# Patient Record
Sex: FEMALE | Race: ASIAN | NOT HISPANIC OR LATINO | Employment: STUDENT | URBAN - METROPOLITAN AREA
[De-identification: names, ages, dates, MRNs, and addresses within clinical notes are randomized per-mention and may not be internally consistent; named-entity substitution may affect disease eponyms.]

---
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
## 2022-02-28 ENCOUNTER — MYC REFILL (OUTPATIENT)
Dept: PEDIATRICS | Facility: CLINIC | Age: 22
End: 2022-02-28

## 2022-02-28 DIAGNOSIS — N94.6 DYSMENORRHEA: ICD-10-CM

## 2022-03-02 RX ORDER — ACETAMINOPHEN AND CODEINE PHOSPHATE 120; 12 MG/5ML; MG/5ML
0.35 SOLUTION ORAL DAILY
Qty: 84 TABLET | Refills: 1 | Status: SHIPPED | OUTPATIENT
Start: 2022-03-02 | End: 2022-08-08

## 2022-03-02 NOTE — TELEPHONE ENCOUNTER
"Last Written Prescription Date:  1/12/21  Last Fill Quantity: 84,  # refills: 3   Last office visit provider:  7/6/21     Requested Prescriptions   Pending Prescriptions Disp Refills     norethindrone (MICRONOR) 0.35 MG tablet 84 tablet 3     Sig: Take 1 tablet (0.35 mg) by mouth daily       Contraceptives Protocol Passed - 3/2/2022  3:04 PM        Passed - Patient is not a current smoker if age is 35 or older        Passed - Recent (12 mo) or future (30 days) visit within the authorizing provider's specialty     Patient has had an office visit with the authorizing provider or a provider within the authorizing providers department within the previous 12 mos or has a future within next 30 days. See \"Patient Info\" tab in inbasket, or \"Choose Columns\" in Meds & Orders section of the refill encounter.              Passed - Medication is active on med list        Passed - No active pregnancy on record        Passed - No positive pregnancy test in past 12 months             Lorenzo Hermosillo RN 03/02/22 3:04 PM  "

## 2022-04-03 ENCOUNTER — HEALTH MAINTENANCE LETTER (OUTPATIENT)
Age: 22
End: 2022-04-03

## 2022-08-08 DIAGNOSIS — N94.6 DYSMENORRHEA: ICD-10-CM

## 2022-08-08 NOTE — TELEPHONE ENCOUNTER
Patients pharmacy faxed over a request for a refill on her birth control. Patient has not been seen since 7/13/2021.

## 2022-08-08 NOTE — TELEPHONE ENCOUNTER
"Routing refill request to provider for review/approval because:  Patient needs to be seen because it has been more than 1 year since last office visit.    Last Written Prescription Date:  3/2/22  Last Fill Quantity: 84,  # refills: 1   Last office visit provider:  7/6/21     Requested Prescriptions   Pending Prescriptions Disp Refills     norethindrone (MICRONOR) 0.35 MG tablet 84 tablet 0     Sig: Take 1 tablet (0.35 mg) by mouth daily       Contraceptives Protocol Failed - 8/8/2022 10:29 AM        Failed - Recent (12 mo) or future (30 days) visit within the authorizing provider's specialty     Patient has had an office visit with the authorizing provider or a provider within the authorizing providers department within the previous 12 mos or has a future within next 30 days. See \"Patient Info\" tab in inbasket, or \"Choose Columns\" in Meds & Orders section of the refill encounter.              Passed - Patient is not a current smoker if age is 35 or older        Passed - Medication is active on med list        Passed - No active pregnancy on record        Passed - No positive pregnancy test in past 12 months             Kayla Ridley RN 08/08/22 4:44 PM  "

## 2022-08-10 RX ORDER — ACETAMINOPHEN AND CODEINE PHOSPHATE 120; 12 MG/5ML; MG/5ML
0.35 SOLUTION ORAL DAILY
Qty: 28 TABLET | Refills: 0 | Status: SHIPPED | OUTPATIENT
Start: 2022-08-10 | End: 2022-09-02

## 2022-08-10 NOTE — TELEPHONE ENCOUNTER
Spoke with patient regarding refill and this has been filled for one month. Patient is scheduled with  on September 2nd.

## 2022-08-10 NOTE — TELEPHONE ENCOUNTER
Patient is overdue for follow up and nothing is scheduled.    I am willing to prescribe a 1 month supply.    Please notify patient and assist in scheduling follow up and well care with Dr. Salmon.

## 2022-09-01 ASSESSMENT — ENCOUNTER SYMPTOMS
DYSURIA: 0
HEADACHES: 0
FREQUENCY: 0
BREAST MASS: 0
HEMATOCHEZIA: 0
EYE PAIN: 0
ARTHRALGIAS: 0
SHORTNESS OF BREATH: 0
CONSTIPATION: 0
FEVER: 0
MYALGIAS: 0
CHILLS: 0
JOINT SWELLING: 0
PALPITATIONS: 0
WEAKNESS: 0
DIZZINESS: 0
PARESTHESIAS: 0
ABDOMINAL PAIN: 0
NAUSEA: 0
DIARRHEA: 0
NERVOUS/ANXIOUS: 0
HEARTBURN: 0
SORE THROAT: 0
HEMATURIA: 0
COUGH: 0

## 2022-09-01 ASSESSMENT — ASTHMA QUESTIONNAIRES
QUESTION_5 LAST FOUR WEEKS HOW WOULD YOU RATE YOUR ASTHMA CONTROL: COMPLETELY CONTROLLED
ACT_TOTALSCORE: 24
QUESTION_2 LAST FOUR WEEKS HOW OFTEN HAVE YOU HAD SHORTNESS OF BREATH: NOT AT ALL
QUESTION_4 LAST FOUR WEEKS HOW OFTEN HAVE YOU USED YOUR RESCUE INHALER OR NEBULIZER MEDICATION (SUCH AS ALBUTEROL): ONCE A WEEK OR LESS
QUESTION_1 LAST FOUR WEEKS HOW MUCH OF THE TIME DID YOUR ASTHMA KEEP YOU FROM GETTING AS MUCH DONE AT WORK, SCHOOL OR AT HOME: NONE OF THE TIME
QUESTION_3 LAST FOUR WEEKS HOW OFTEN DID YOUR ASTHMA SYMPTOMS (WHEEZING, COUGHING, SHORTNESS OF BREATH, CHEST TIGHTNESS OR PAIN) WAKE YOU UP AT NIGHT OR EARLIER THAN USUAL IN THE MORNING: NOT AT ALL
ACT_TOTALSCORE: 24

## 2022-09-02 ENCOUNTER — OFFICE VISIT (OUTPATIENT)
Dept: PEDIATRICS | Facility: CLINIC | Age: 22
End: 2022-09-02
Payer: COMMERCIAL

## 2022-09-02 VITALS
SYSTOLIC BLOOD PRESSURE: 102 MMHG | HEIGHT: 61 IN | DIASTOLIC BLOOD PRESSURE: 68 MMHG | WEIGHT: 144.5 LBS | BODY MASS INDEX: 27.28 KG/M2 | TEMPERATURE: 98.6 F | RESPIRATION RATE: 18 BRPM

## 2022-09-02 DIAGNOSIS — Z00.00 ROUTINE HISTORY AND PHYSICAL EXAMINATION OF ADULT: Primary | ICD-10-CM

## 2022-09-02 DIAGNOSIS — N94.6 DYSMENORRHEA: ICD-10-CM

## 2022-09-02 DIAGNOSIS — G43.109 MIGRAINE WITH AURA AND WITHOUT STATUS MIGRAINOSUS, NOT INTRACTABLE: ICD-10-CM

## 2022-09-02 DIAGNOSIS — R05.9 COUGH: ICD-10-CM

## 2022-09-02 DIAGNOSIS — J45.30 MILD PERSISTENT ASTHMA WITHOUT COMPLICATION: ICD-10-CM

## 2022-09-02 PROCEDURE — 90471 IMMUNIZATION ADMIN: CPT | Performed by: PEDIATRICS

## 2022-09-02 PROCEDURE — 90686 IIV4 VACC NO PRSV 0.5 ML IM: CPT | Performed by: PEDIATRICS

## 2022-09-02 PROCEDURE — 99213 OFFICE O/P EST LOW 20 MIN: CPT | Mod: 25 | Performed by: PEDIATRICS

## 2022-09-02 PROCEDURE — 99395 PREV VISIT EST AGE 18-39: CPT | Mod: 25 | Performed by: PEDIATRICS

## 2022-09-02 RX ORDER — ACETAMINOPHEN AND CODEINE PHOSPHATE 120; 12 MG/5ML; MG/5ML
0.35 SOLUTION ORAL DAILY
Qty: 84 TABLET | Refills: 4 | Status: SHIPPED | OUTPATIENT
Start: 2022-09-02 | End: 2023-09-01

## 2022-09-02 RX ORDER — LORATADINE 10 MG/1
10 TABLET ORAL DAILY
COMMUNITY
End: 2023-09-01

## 2022-09-02 RX ORDER — FLUTICASONE PROPIONATE 50 MCG
1 SPRAY, SUSPENSION (ML) NASAL DAILY
Qty: 16 G | Refills: 3 | Status: SHIPPED | OUTPATIENT
Start: 2022-09-02 | End: 2023-09-01

## 2022-09-02 RX ORDER — ALBUTEROL SULFATE 90 UG/1
AEROSOL, METERED RESPIRATORY (INHALATION)
Qty: 18 G | Refills: 0 | Status: SHIPPED | OUTPATIENT
Start: 2022-09-02 | End: 2023-09-01

## 2022-09-02 RX ORDER — AMITRIPTYLINE HYDROCHLORIDE 10 MG/1
10 TABLET ORAL AT BEDTIME
Qty: 30 TABLET | Refills: 3 | Status: SHIPPED | OUTPATIENT
Start: 2022-09-02 | End: 2023-09-01

## 2022-09-02 RX ORDER — FLUTICASONE PROPIONATE 44 UG/1
2 AEROSOL, METERED RESPIRATORY (INHALATION) 2 TIMES DAILY
Qty: 10.6 G | Refills: 3 | Status: SHIPPED | OUTPATIENT
Start: 2022-09-02 | End: 2023-09-01

## 2022-09-02 ASSESSMENT — PAIN SCALES - GENERAL: PAINLEVEL: NO PAIN (0)

## 2022-09-02 NOTE — PATIENT INSTRUCTIONS
You should have physicals yearly, flu vaccine recommended every year in the fall.     Start amitriptilin at bed time as a preventative headache medicine    Let me know in a month how the medicine is working for you    If your headaches are not improving with the new medication, I would recommend looking into scheduling an appointment with neurology    Use ibuprofen as needed for your headaches    Call if you have any questions.    You next tetanus shot is due in 2026.  If you have any kind of cut or puncture wound before then, you should get your tetanus shot at that time.     You do not need a Pap smear until you are 21    Healthcare decisions   Now is a good time to think about and learn the skills you need to manage your own medical care     Schedule your own appointments.    Talk honestly and openly with your clinician about your symptoms, medical history and lifestyle.    Understand your medical condition, if you have one.    Know what medications you need to take and how to get your prescriptions refilled.    Understand your healthcare insurance benefits.     Think about advanced directives, medical decision making, if you are not able.  You should discuss this with your parents.    [place] and Vote!   Your future depends on it. Climate change, healthcare, education  [place]    Patient Education    [employer] HANDOUT- PATIENT  18 THROUGH 21 YEAR VISITS  Here are some suggestions from HW experts that may be of value to your family.     HOW YOU ARE DOING  Enjoy spending time with your family.  Find activities you are really interested in, such as sports, theater, or volunteering.  Try to be responsible for your schoolwork or work obligations.  Always talk through problems and never use violence.  If you get angry with someone, try to walk away.  If you feel unsafe in your home or have been hurt by someone, let us know. Hotlines and community agencies can also provide confidential  help.  Talk with us if you are worried about your living or food situation. Community agencies and programs such as SNAP can help.  Don t smoke, vape, or use drugs. Avoid people who do when you can. Talk with us if you are worried about alcohol or drug use in your family.    YOUR DAILY LIFE  Visit the dentist at least twice a year.  Brush your teeth at least twice a day and floss once a day.  Be a healthy eater.  Have vegetables, fruits, lean protein, and whole grains at meals and snacks.  Limit fatty, sugary, salty foods that are low in nutrients, such as candy, chips, and ice cream.  Eat when you re hungry. Stop when you feel satisfied.  Eat breakfast.  Drink plenty of water.  Make sure to get enough calcium every day.  Have 3 or more servings of low-fat (1%) or fat-free milk and other low-fat dairy products, such as yogurt and cheese.  Women: Make sure to eat foods rich in folate, such as fortified grains and dark- green leafy vegetables.  Aim for at least 1 hour of physical activity every day.  Wear safety equipment when you play sports.  Get enough sleep.  Talk with us about managing your health care and insurance as an adult.    YOUR FEELINGS  Most people have ups and downs. If you are feeling sad, depressed, nervous, irritable, hopeless, or angry, let us know or reach out to another health care professional.  Figure out healthy ways to deal with stress.  Try your best to solve problems and make decisions on your own.  Sexuality is an important part of your life. If you have any questions or concerns, we are here for you.    HEALTHY BEHAVIOR CHOICES  Avoid using drugs, alcohol, tobacco, steroids, and diet pills. Support friends who choose not to use.  If you use drugs or alcohol, let us know or talk with another trusted adult about it. We can help you with quitting or cutting down on your use.  Make healthy decisions about your sexual behavior.  If you are sexually active, always practice safe sex. Always use  birth control along with a condom to prevent pregnancy and sexually transmitted infections.  All sexual activity should be something you want. No one should ever force or try to convince you.  Protect your hearing at work, home, and concerts. Keep your earbud volume down.    STAYING SAFE  Always be a safe and cautious .  Insist that everyone use a lap and shoulder seat belt.  Limit the number of friends in the car and avoid driving at night.  Avoid distractions. Never text or talk on the phone while you drive.  Do not ride in a vehicle with someone who has been using drugs or alcohol.  If you feel unsafe driving or riding with someone, call someone you trust to drive you.  Wear helmets and protective gear while playing sports. Wear a helmet when riding a bike, a motorcycle, or an ATV or when skiing or skateboarding.  Always use sunscreen and a hat when you re outside.  Fighting and carrying weapons can be dangerous. Talk with your parents, teachers, or doctor about how to avoid these situations.        Consistent with Bright Futures: Guidelines for Health Supervision of Infants, Children, and Adolescents, 4th Edition  For more information, go to https://brightfutures.aap.org.

## 2022-09-02 NOTE — PROGRESS NOTES
"   SUBJECTIVE:   CC: Kelsie Dobbs is an 22 year old woman who presents for preventive health visit.     Patient has been advised of split billing requirements and indicates understanding: Yes  Healthy Habits:     Getting at least 3 servings of Calcium per day:  Yes    Bi-annual eye exam:  Yes    Dental care twice a year:  NO    Sleep apnea or symptoms of sleep apnea:  Daytime drowsiness    Diet:  Regular (no restrictions)    Frequency of exercise:  4-5 days/week    Duration of exercise:  30-45 minutes    Taking medications regularly:  Yes    Medication side effects:  None    PHQ-2 Total Score: 1    Additional concerns today:  Yes    Patient is a 22 y.o. female who presents in clinic for a preventative care visit. 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 Patient is going back to school next week.    Patient has concerns about her migraines since she is going back to school next week. She has been seen in the past for migraines. She reports her migraines still come on \"randomly\". She does tend to get migraines when the weather is changing or it is very hot. She states she hardly got any migraines during this past winter. She reports she believes that the heat causes her to have migraines, dizziness, and nausea. On average, she gets a migraine once every two months. This summer however, she got four migraines in three months. When she does have a migraine, it usually lasts for a few hours. When the migraines are relieved, the nausea and dizziness go away as well. She usually takes 800 mg of ibuprofen or 1000 mg tylenol. She reports the ibuprofen usually helps with the pain. She normally has to take the ibuprofen more than once if she has a migraine. She did try to take Imitrex for her migraines, but it did not work. She is open to trying an alternative preventative headache medicine. She usually " takes a nap when she gets a migraine. She states her migraines have gotten better, but it still has not been great. Her migraines have also gotten better since starting birth control. She is currently taking Micronor for birth control. She does get her period occasionally. She started taking Micronor sometime last year. Patient reports her motion sickness has gotten better and she does not really get sick during long car rides anymore.     Patient states her sleep has been good. She sleeps on average for about 8 hours. She feels well rested when she wakes up in the morning. She does not get headaches that wake her up at night. She tries to eat a well balanced diet with plenty of fruits and vegetables. She reports she drinks enough water throughout the day. Patient tries to remain active most of the time. She has not been to the dentist in a while. She does brush her teeth regularly.    Today's PHQ-2 Score:   PHQ-2 ( 1999 Pfizer) 9/1/2022   Q1: Little interest or pleasure in doing things 1   Q2: Feeling down, depressed or hopeless 0   PHQ-2 Score 1   Q1: Little interest or pleasure in doing things Several days   Q2: Feeling down, depressed or hopeless Not at all   PHQ-2 Score 1     Abuse: Current or Past (Physical, Sexual or Emotional) - No  Do you feel safe in your environment? Yes    Have you ever done Advance Care Planning? (For example, a Health Directive, POLST, or a discussion with a medical provider or your loved ones about your wishes): No, advance care planning information given to patient to review.  Patient declined advance care planning discussion at this time.    Social History     Tobacco Use     Smoking status: Never Smoker     Smokeless tobacco: Never Used   Substance Use Topics     Alcohol use: Never     Alcohol Use 9/1/2022   Prescreen: >3 drinks/day or >7 drinks/week? Not Applicable       Reviewed orders with patient.  Reviewed health maintenance and updated orders accordingly - Yes    ACT Total  "Scores 1/14/2021 7/6/2021 9/1/2022   ACT TOTAL SCORE (Goal Greater than or Equal to 20) 16 23 24   In the past 12 months, how many times did you visit the emergency room for your asthma without being admitted to the hospital? 0 0 0   In the past 12 months, how many times were you hospitalized overnight because of your asthma? 0 0 0     Patient reports her asthma has been good. She only uses the FLOVENT and albuterol when she has a cold. She does not have any difficulties breathing when she is doing physical activity. She does have seasonal allergies that are worse in the spring. She does take Claritin for her allergies if they flare up.   ACT score = 24    History of abnormal Pap smear: NO - under age 21, PAP not appropriate for age     Reviewed and updated as needed this visit by clinical staff   Tobacco  Allergies  Meds   Med Hx  Surg Hx  Fam Hx  Soc Hx          Reviewed and updated as needed this visit by Provider                       Review of Systems  Constitutional, eye, ENT, skin, respiratory, cardiac, and GI are normal except as otherwise noted.    PSFH:  No recent change to medical, surgical, family, or social history.  OBJECTIVE:   /68 (BP Location: Right arm, Patient Position: Sitting, Cuff Size: Adult Regular)   Temp 98.6  F (37  C) (Oral)   Resp 18   Ht 5' 1\" (1.549 m)   Wt 144 lb 8 oz (65.5 kg)   LMP 08/01/2022 (Approximate)   BMI 27.30 kg/m    Physical Exam  Constitutional: Appears well-developed and well-nourished.   HEENT: Head: Normocephalic.    Right Ear: Tympanic membrane, external ear and canal normal.    Left Ear: Tympanic membrane, external ear and canal normal.    Nose: Nose normal.    Mouth/Throat: Mucous membranes are moist. Oropharynx is clear.    Eyes: Conjunctivae and lids are normal. Pupils are equal, round, and reactive to light.   Neck: Neck supple. No tenderness is present.   Cardiovascular: Normal rate and regular rhythm. No murmur heard.  Pulmonary/Chest: Effort " normal and breath sounds normal. There is normal air entry. Breast development is normal.   Abdominal: Soft. There is no hepatosplenomegaly. No inguinal hernia.   Musculoskeletal: Normal range of motion. Normal strength and tone. No abnormalities. Spine is straight. Normal duck walk.  Normal heel to toe walk.   : Normal external genitalia.Garrison stage 5.   Neurological: Alert, normal reflexes. Gait normal.   Psychiatric: Normal mood and affect, speech and behavior normal.  Skin: Clear. No rashes.     ASSESSMENT/PLAN:   Kelsie was seen today for physical.    Diagnoses and all orders for this visit:    Routine history and physical examination of adult  -     INFLUENZA VACCINE IM > 6 MONTHS VALENT IIV4 (AFLURIA/FLUZONE)    Migraine with aura and without status migrainosus, not intractable  -     amitriptyline (ELAVIL) 10 MG tablet; Take 1 tablet (10 mg) by mouth At Bedtime    Mild persistent asthma without complication  -     fluticasone (FLOVENT HFA) 44 MCG/ACT inhaler; Inhale 2 puffs into the lungs 2 times daily [FLOVENT  MCG/ACTUATION INHALER] INHALE 2 PUFFS BY MOUTH TWICE DAILY  -     fluticasone (FLONASE) 50 MCG/ACT nasal spray; Spray 1 spray into both nostrils daily    Cough  -     albuterol (PROAIR HFA/PROVENTIL HFA/VENTOLIN HFA) 108 (90 Base) MCG/ACT inhaler; INHALE 2 PUFFS BY MOUTH 15 MINUTES BEFORE EXERCISE AND EVERY 4 HOURS AS NEEDED. ALWAYS USE SPACER    Dysmenorrhea  -     norethindrone (MICRONOR) 0.35 MG tablet; Take 1 tablet (0.35 mg) by mouth daily    Other orders  -     REVIEW OF HEALTH MAINTENANCE PROTOCOL ORDERS  -     Cancel: INFLUENZA VACCINE IM > 6 MONTHS VALENT IIV4 (AFLURIA/FLUZONE)      Patient has been advised of split billing requirements and indicates understanding: Yes    COUNSELING:  Reviewed preventive health counseling, as reflected in patient instructions  Special attention given to:        Regular exercise       Healthy diet/nutrition       Contraception    Estimated body mass  "index is 27.3 kg/m  as calculated from the following:    Height as of this encounter: 5' 1\" (1.549 m).    Weight as of this encounter: 144 lb 8 oz (65.5 kg).      She reports that she has never smoked. She has never used smokeless tobacco.    Counseling Resources:  ATP IV Guidelines  Pooled Cohorts Equation Calculator  Breast Cancer Risk Calculator  BRCA-Related Cancer Risk Assessment: FHS-7 Tool  FRAX Risk Assessment  ICSI Preventive Guidelines  Dietary Guidelines for Americans, 2010  USDA's MyPlate  ASA Prophylaxis  Lung CA Screening      ADDITIONAL HISTORY SUMMARIZED (2): None.  DECISION TO OBTAIN EXTRA INFORMATION (1): None.   RADIOLOGY TESTS (1): None.  LABS (1): None.  MEDICINE TESTS (1): None.  INDEPENDENT REVIEW (2 each): None.     Time in: 1:59 pm  Time out: 2:35 pm    The visit lasted a total of 36 minutes spent on the date of the encounter doing chart review, history and exam, documentation, and further activities as noted above.     I, Favio Ventura, am scribing for and in the presence of, Dr. Salmon.    I, Dr. Salmon, personally performed the services described in this documentation, as scribed by Favio Ventura in my presence, and it is both accurate and complete.    Total data points: 0    Nalini Salmon MD  [facility]  "

## 2023-08-31 ASSESSMENT — ENCOUNTER SYMPTOMS
HEARTBURN: 0
PALPITATIONS: 0
NAUSEA: 0
BREAST MASS: 0
PARESTHESIAS: 0
MYALGIAS: 0
FEVER: 0
SHORTNESS OF BREATH: 0
CHILLS: 0
DYSURIA: 0
HEMATOCHEZIA: 0
ABDOMINAL PAIN: 0
DIZZINESS: 0
JOINT SWELLING: 0
FREQUENCY: 0
ARTHRALGIAS: 0
SORE THROAT: 0
DIARRHEA: 0
EYE PAIN: 0
HEADACHES: 0
COUGH: 0
CONSTIPATION: 0
NERVOUS/ANXIOUS: 0
WEAKNESS: 0
HEMATURIA: 0

## 2023-08-31 ASSESSMENT — ASTHMA QUESTIONNAIRES
QUESTION_4 LAST FOUR WEEKS HOW OFTEN HAVE YOU USED YOUR RESCUE INHALER OR NEBULIZER MEDICATION (SUCH AS ALBUTEROL): NOT AT ALL
ACT_TOTALSCORE: 24
QUESTION_2 LAST FOUR WEEKS HOW OFTEN HAVE YOU HAD SHORTNESS OF BREATH: NOT AT ALL
QUESTION_3 LAST FOUR WEEKS HOW OFTEN DID YOUR ASTHMA SYMPTOMS (WHEEZING, COUGHING, SHORTNESS OF BREATH, CHEST TIGHTNESS OR PAIN) WAKE YOU UP AT NIGHT OR EARLIER THAN USUAL IN THE MORNING: NOT AT ALL
ACT_TOTALSCORE: 24
QUESTION_1 LAST FOUR WEEKS HOW MUCH OF THE TIME DID YOUR ASTHMA KEEP YOU FROM GETTING AS MUCH DONE AT WORK, SCHOOL OR AT HOME: NONE OF THE TIME
QUESTION_5 LAST FOUR WEEKS HOW WOULD YOU RATE YOUR ASTHMA CONTROL: WELL CONTROLLED

## 2023-09-01 ENCOUNTER — OFFICE VISIT (OUTPATIENT)
Dept: PEDIATRICS | Facility: CLINIC | Age: 23
End: 2023-09-01
Payer: COMMERCIAL

## 2023-09-01 ENCOUNTER — PATIENT OUTREACH (OUTPATIENT)
Dept: CARE COORDINATION | Facility: CLINIC | Age: 23
End: 2023-09-01

## 2023-09-01 VITALS
BODY MASS INDEX: 27.75 KG/M2 | SYSTOLIC BLOOD PRESSURE: 100 MMHG | HEART RATE: 83 BPM | TEMPERATURE: 98.3 F | RESPIRATION RATE: 24 BRPM | WEIGHT: 147 LBS | OXYGEN SATURATION: 97 % | HEIGHT: 61 IN | DIASTOLIC BLOOD PRESSURE: 68 MMHG

## 2023-09-01 DIAGNOSIS — Z00.00 ROUTINE HISTORY AND PHYSICAL EXAMINATION OF ADULT: Primary | ICD-10-CM

## 2023-09-01 DIAGNOSIS — J45.30 MILD PERSISTENT ASTHMA WITHOUT COMPLICATION: ICD-10-CM

## 2023-09-01 DIAGNOSIS — J30.1 SEASONAL ALLERGIC RHINITIS DUE TO POLLEN: ICD-10-CM

## 2023-09-01 DIAGNOSIS — R05.9 COUGH, UNSPECIFIED TYPE: ICD-10-CM

## 2023-09-01 DIAGNOSIS — N94.6 DYSMENORRHEA: ICD-10-CM

## 2023-09-01 PROCEDURE — 87491 CHLMYD TRACH DNA AMP PROBE: CPT | Performed by: PEDIATRICS

## 2023-09-01 PROCEDURE — 99395 PREV VISIT EST AGE 18-39: CPT | Performed by: PEDIATRICS

## 2023-09-01 RX ORDER — LORATADINE 10 MG/1
10 TABLET ORAL DAILY
Qty: 90 TABLET | Refills: 3 | Status: SHIPPED | OUTPATIENT
Start: 2023-09-01

## 2023-09-01 RX ORDER — ACETAMINOPHEN AND CODEINE PHOSPHATE 120; 12 MG/5ML; MG/5ML
0.35 SOLUTION ORAL DAILY
Qty: 84 TABLET | Refills: 4 | Status: SHIPPED | OUTPATIENT
Start: 2023-09-01

## 2023-09-01 RX ORDER — FLUTICASONE PROPIONATE 50 MCG
2 BLISTER, WITH INHALATION DEVICE INHALATION EVERY 12 HOURS
Qty: 60 EACH | Refills: 4 | Status: SHIPPED | OUTPATIENT
Start: 2023-09-01

## 2023-09-01 RX ORDER — ALBUTEROL SULFATE 90 UG/1
AEROSOL, METERED RESPIRATORY (INHALATION)
Qty: 8.5 G | Refills: 3 | Status: SHIPPED | OUTPATIENT
Start: 2023-09-01

## 2023-09-01 RX ORDER — FLUTICASONE PROPIONATE 50 MCG
1 SPRAY, SUSPENSION (ML) NASAL DAILY
Qty: 16 G | Refills: 11 | Status: SHIPPED | OUTPATIENT
Start: 2023-09-01

## 2023-09-01 ASSESSMENT — PAIN SCALES - GENERAL: PAINLEVEL: NO PAIN (0)

## 2023-09-01 NOTE — LETTER
My Asthma Action Plan    Name: Kelsie Dobbs   YOB: 2000  Date: 9/1/2023   My doctor: Nalini Salmon MD   My clinic: [facility]        My Control Medicine: INHALED CORTICOSTEROIDS  Fluticasone propionate (Flovent Diskus) - 50 mcg 2 puffs  My Rescue Medicine: Albuterol (Proair/Ventolin/Proventil HFA) 2-4 puffs EVERY 4 HOURS as needed. Use a spacer if recommended by your provider.   My Asthma Severity:   Mild Persistent  Know your asthma triggers: upper respiratory infections, pollens, and exercise or sports               GREEN ZONE   Good Control  I feel good  No cough or wheeze  Can work, sleep and play without asthma symptoms       Take your asthma control medicine every day.     If exercise triggers your asthma, take your rescue medication  15 minutes before exercise or sports, and  During exercise if you have asthma symptoms  Spacer to use with inhaler: If you have a spacer, make sure to use it with your inhaler             YELLOW ZONE Getting Worse  I have ANY of these:  I do not feel good  Cough or wheeze  Chest feels tight  Wake up at night   Keep taking your Green Zone medications  Start taking your rescue medicine:  every 20 minutes for up to 1 hour. Then every 4 hours for 24-48 hours.  If you stay in the Yellow Zone for more than 12-24 hours, contact your doctor.  If you do not return to the Green Zone in 12-24 hours or you get worse, start taking your oral steroid medicine if prescribed by your provider.           RED ZONE Medical Alert - Get Help  I have ANY of these:  I feel awful  Medicine is not helping  Breathing getting harder  Trouble walking or talking  Nose opens wide to breathe       Take your rescue medicine NOW  If your provider has prescribed an oral steroid medicine, start taking it NOW  Call your doctor NOW  If you are still in the Red Zone after 20 minutes and you have not reached your doctor:  Take your rescue medicine again and  Call 911 or go to the  emergency room right away    See your regular doctor within 2 weeks of an Emergency Room or Urgent Care visit for follow-up treatment.          Annual Reminders:  Meet with Asthma Educator,  Flu Shot in the Fall, consider Pneumonia Vaccination for patients with asthma (aged 19 and older).    Pharmacy:    [id number] - [place], [place] - [address] [facility] [address] [facility] - [address]    Electronically signed by Nalini Salmon MD   Date: 09/01/23                      Asthma Triggers  How To Control Things That Make Your Asthma Worse    Triggers are things that make your asthma worse.  Look at the list below to help you find your triggers and what you can do about them.  You can help prevent asthma flare-ups by staying away from your triggers.      Trigger                                                          What you can do   Cigarette Smoke  Tobacco smoke can make asthma worse. Do not allow smoking in your home, car or around you.  Be sure no one smokes at a child s day care or school.  If you smoke, ask your health care provider for ways to help you quit.  Ask family members to quit too.  Ask your health care provider for a referral to Quit Plan to help you quit smoking, or call 2-957-704-PLAN.     Colds, Flu, Bronchitis  These are common triggers of asthma. Wash your hands often.  Don t touch your eyes, nose or mouth.  Get a flu shot every year.     Dust Mites  These are tiny bugs that live in cloth or carpet. They are too small to see. Wash sheets and blankets in hot water every week.   Encase pillows and mattress in dust mite proof covers.  Avoid having carpet if you can. If you have carpet, vacuum weekly.   Use a dust mask and HEPA vacuum.   Pollen and Outdoor Mold  Some people are allergic to trees, grass, or weed pollen, or molds. Try to keep your windows closed.  Limit time out doors when pollen count is high.   Ask you  health care provider about taking medicine during allergy season.     Animal Dander  Some people are allergic to skin flakes, urine or saliva from pets with fur or feathers. Keep pets with fur or feathers out of your home.    If you can t keep the pet outdoors, then keep the pet out of your bedroom.  Keep the bedroom door closed.  Keep pets off cloth furniture and away from stuffed toys.     Mice, Rats, and Cockroaches   Some people are allergic to the waste from these pests.   Cover food and garbage.  Clean up spills and food crumbs.  Store grease in the refrigerator.   Keep food out of the bedroom.   Indoor Mold  This can be a trigger if your home has high moisture. Fix leaking faucets, pipes, or other sources of water.   Clean moldy surfaces.  Dehumidify basement if it is damp and smelly.   Smoke, Strong Odors, and Sprays  These can reduce air quality. Stay away from strong odors and sprays, such as perfume, powder, hair spray, paints, smoke incense, paint, cleaning products, candles and new carpet.   Exercise or Sports  Some people with asthma have this trigger. Be active!  Ask your doctor about taking medicine before sports or exercise to prevent symptoms.    Warm up for 5-10 minutes before and after sports or exercise.     Other Triggers of Asthma  Cold air:  Cover your nose and mouth with a scarf.  Sometimes laughing or crying can be a trigger.  Some medicines and food can trigger asthma.

## 2023-09-01 NOTE — PROGRESS NOTES
Preventive Care Visit  [facility]  Nalini Salmon MD, Pediatrics  Sep 1, 2023    Assessment & Plan   23 year old, here for preventive care.    Kelsie was seen today for well child.    Diagnoses and all orders for this visit:    Routine history and physical examination of adult  -     Chlamydia trachomatis PCR; Future  -     Chlamydia trachomatis PCR    Cough    Mild persistent asthma without complication  -     fluticasone (FLONASE) 50 MCG/ACT nasal spray; Spray 1 spray into both nostrils daily  -     fluticasone (FLOVENT DISKUS) 50 MCG/ACT inhaler; Inhale 2 puffs into the lungs every 12 hours  -     albuterol (PROAIR HFA) 108 (90 Base) MCG/ACT inhaler; Inhale 2 puffs 15 minutes before exercise and every 4 hours as needed for cough or wheeze    Dysmenorrhea  -     norethindrone (MICRONOR) 0.35 MG tablet; Take 1 tablet (0.35 mg) by mouth daily    Seasonal allergic rhinitis due to pollen  -     loratadine (CLARITIN) 10 MG tablet; Take 1 tablet (10 mg) by mouth daily      See new asthma plan  Continue same OCP    Follow up one year with asthma check, PAP  Discussed transition to adult provider    Patient has been advised of split billing requirements and indicates understanding: Yes      Immunizations   Vaccines up to date.MenB Vaccine series already completed.    Anticipatory Guidance    Reviewed age appropriate anticipatory guidance.       Referrals/Ongoing Specialty Care  None      Subjective     Chief Complaint: Annual Exam:    Additional Questions   Questions for today's visit No       Answers submitted by the patient for this visit:  Annual Preventive Visit (Submitted on 8/31/2023)  Chief Complaint: Annual Exam:  Frequency of exercise:: 2-3 days/week  Getting at least 3 servings of Calcium per day:: Yes  Diet:: Regular (no restrictions)  Taking medications regularly:: Yes  Medication side effects:: None  Bi-annual eye exam:: Yes  Dental care twice a year:: NO  Sleep apnea or symptoms of  "sleep apnea:: None  abdominal pain: No  Blood in stool: No  Blood in urine: No  chest pain: No  chills: No  congestion: No  constipation: No  cough: No  diarrhea: No  dizziness: No  ear pain: No  eye pain: No  nervous/anxious: No  fever: No  frequency: No  genital sores: No  headaches: No  hearing loss: No  heartburn: No  arthralgias: No  joint swelling: No  peripheral edema: No  mood changes: No  myalgias: No  nausea: No  dysuria: No  palpitations: No  Skin sensation changes: No  sore throat: No  urgency: No  rash: No  shortness of breath: No  visual disturbance: No  weakness: No  pelvic pain: No  vaginal bleeding: No  vaginal discharge: No  tenderness: No  breast mass: No  breast discharge: No  Additional concerns today:: No    Exercise outside of work     Duration of exercise:: 45-60 minutes      9/2/2022     1:41 PM            Starting senior year at [employer]  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  In the past 4 weeks, how much of the time did your asthma keep you from getting as much done at work, school or at home?: None of the time  2.  During the past 4 weeks, how often have you had shortness of breath?: Not at all  3.  During the past 4 weeks, how often did your asthma symptoms (wheezing, coughing, shortness of breath, chest tightness or pain) wake you up at night or earlier than usual in the morning?: Not at all  4.  During the past 4 weeks, how often have you used your rescue inhaler or nebulizer medication (such as albuterol)?: Not at all  5.  How would " you rate your asthma control during the past 4 weeks?: Well controlled  ACT TOTAL SCORE (Goal Greater than or Equal to 20): 24  In the past 12 months, how many times did you visit the emergency room for your asthma without being admitted to the hospital?: None  In the past 12 months, how many times were you hospitalized overnight because of your asthma?: None      95     Objective     Exam      Physical Exam      Constitutional: Appears well-developed and well-nourished.   HEENT: Head: Normocephalic.    Right Ear: Tympanic membrane, external ear and canal normal.    Left Ear: Tympanic membrane, external ear and canal normal.    Nose: Nose normal.    Mouth/Throat: Mucous membranes are moist. Oropharynx is clear.    Eyes: Conjunctivae and lids are normal. Pupils are equal, round, and reactive to light.   Neck: Neck supple. No tenderness is present.   Cardiovascular: Normal rate and regular rhythm. No murmur heard.  Pulmonary/Chest: Effort normal and breath sounds normal. There is normal air entry. Breast development is normal.   Abdominal: Soft. There is no hepatosplenomegaly. No inguinal hernia.   : Normal external genitalia.Garrison stage 5.   Neurological: Alert, normal reflexes. Gait normal.   Psychiatric: Normal mood and affect, speech and behavior normal.  Skin: Clear. No rashes.          Nalini Salmon MD  [facility]

## 2023-09-01 NOTE — PATIENT INSTRUCTIONS
Dr [facility] - Family Medicine in [place]    Tetanus booster due in 2026 - unless you have a bad cut or puncture wound sooner    Physical due in one year - with PAP    Asthma check one year - unless you are having more problems    Flu shot in the fall    COVID booster - per CDC recommendations - your last one was the bivalent vaccine in Jan 2023

## 2023-09-02 LAB — C TRACH DNA SPEC QL NAA+PROBE: NEGATIVE

## 2023-09-15 ENCOUNTER — PATIENT OUTREACH (OUTPATIENT)
Dept: CARE COORDINATION | Facility: CLINIC | Age: 23
End: 2023-09-15
Payer: COMMERCIAL

## 2024-08-19 ASSESSMENT — ASTHMA QUESTIONNAIRES
QUESTION_1 LAST FOUR WEEKS HOW MUCH OF THE TIME DID YOUR ASTHMA KEEP YOU FROM GETTING AS MUCH DONE AT WORK, SCHOOL OR AT HOME: NONE OF THE TIME
QUESTION_4 LAST FOUR WEEKS HOW OFTEN HAVE YOU USED YOUR RESCUE INHALER OR NEBULIZER MEDICATION (SUCH AS ALBUTEROL): ONCE A WEEK OR LESS
QUESTION_5 LAST FOUR WEEKS HOW WOULD YOU RATE YOUR ASTHMA CONTROL: COMPLETELY CONTROLLED
QUESTION_2 LAST FOUR WEEKS HOW OFTEN HAVE YOU HAD SHORTNESS OF BREATH: ONCE OR TWICE A WEEK
ACT_TOTALSCORE: 23
QUESTION_3 LAST FOUR WEEKS HOW OFTEN DID YOUR ASTHMA SYMPTOMS (WHEEZING, COUGHING, SHORTNESS OF BREATH, CHEST TIGHTNESS OR PAIN) WAKE YOU UP AT NIGHT OR EARLIER THAN USUAL IN THE MORNING: NOT AT ALL
ACT_TOTALSCORE: 23

## 2024-08-20 ENCOUNTER — OFFICE VISIT (OUTPATIENT)
Dept: FAMILY MEDICINE | Facility: CLINIC | Age: 24
End: 2024-08-20
Payer: COMMERCIAL

## 2024-08-20 ENCOUNTER — HOSPITAL ENCOUNTER (OUTPATIENT)
Dept: GENERAL RADIOLOGY | Facility: HOSPITAL | Age: 24
Discharge: HOME | End: 2024-08-20
Attending: FAMILY MEDICINE | Admitting: FAMILY MEDICINE
Payer: COMMERCIAL

## 2024-08-20 VITALS
HEIGHT: 61 IN | WEIGHT: 149.6 LBS | SYSTOLIC BLOOD PRESSURE: 98 MMHG | OXYGEN SATURATION: 100 % | BODY MASS INDEX: 28.25 KG/M2 | TEMPERATURE: 98.3 F | RESPIRATION RATE: 16 BRPM | DIASTOLIC BLOOD PRESSURE: 62 MMHG | HEART RATE: 78 BPM

## 2024-08-20 DIAGNOSIS — Z12.4 CERVICAL CANCER SCREENING: ICD-10-CM

## 2024-08-20 DIAGNOSIS — Z23 ENCOUNTER FOR VACCINATION: ICD-10-CM

## 2024-08-20 DIAGNOSIS — J30.1 SEASONAL ALLERGIC RHINITIS DUE TO POLLEN: ICD-10-CM

## 2024-08-20 DIAGNOSIS — M54.50 LUMBAR BACK PAIN: ICD-10-CM

## 2024-08-20 DIAGNOSIS — L70.0 ACNE VULGARIS: ICD-10-CM

## 2024-08-20 DIAGNOSIS — Z11.3 SCREENING FOR STDS (SEXUALLY TRANSMITTED DISEASES): ICD-10-CM

## 2024-08-20 DIAGNOSIS — G43.109 MIGRAINE WITH AURA AND WITHOUT STATUS MIGRAINOSUS, NOT INTRACTABLE: Primary | ICD-10-CM

## 2024-08-20 PROCEDURE — G0145 SCR C/V CYTO,THINLAYER,RESCR: HCPCS | Performed by: FAMILY MEDICINE

## 2024-08-20 PROCEDURE — 90471 IMMUNIZATION ADMIN: CPT | Performed by: FAMILY MEDICINE

## 2024-08-20 PROCEDURE — 90677 PCV20 VACCINE IM: CPT | Performed by: FAMILY MEDICINE

## 2024-08-20 PROCEDURE — 87591 N.GONORRHOEAE DNA AMP PROB: CPT | Performed by: FAMILY MEDICINE

## 2024-08-20 PROCEDURE — 72100 X-RAY EXAM L-S SPINE 2/3 VWS: CPT

## 2024-08-20 PROCEDURE — 99204 OFFICE O/P NEW MOD 45 MIN: CPT | Mod: 25 | Performed by: FAMILY MEDICINE

## 2024-08-20 PROCEDURE — 87491 CHLMYD TRACH DNA AMP PROBE: CPT | Performed by: FAMILY MEDICINE

## 2024-08-20 RX ORDER — SUMATRIPTAN 50 MG/1
50 TABLET, FILM COATED ORAL
Qty: 16 TABLET | Refills: 1 | Status: SHIPPED | OUTPATIENT
Start: 2024-08-20

## 2024-08-20 RX ORDER — CLINDAMYCIN PHOSPHATE AND BENZOYL PEROXIDE 10; 50 MG/G; MG/G
GEL TOPICAL
Qty: 45 G | Refills: 5 | Status: SHIPPED | OUTPATIENT
Start: 2024-08-20

## 2024-08-20 RX ORDER — NAPROXEN 500 MG/1
500 TABLET ORAL 2 TIMES DAILY WITH MEALS
Qty: 60 TABLET | Refills: 1 | Status: SHIPPED | OUTPATIENT
Start: 2024-08-20

## 2024-08-20 RX ORDER — ADAPALENE 45 G/G
GEL TOPICAL AT BEDTIME
Qty: 45 G | Refills: 2 | Status: SHIPPED | OUTPATIENT
Start: 2024-08-20

## 2024-08-20 RX ORDER — ONDANSETRON 4 MG/1
4 TABLET, ORALLY DISINTEGRATING ORAL EVERY 8 HOURS PRN
Qty: 20 TABLET | Refills: 1 | Status: SHIPPED | OUTPATIENT
Start: 2024-08-20

## 2024-08-20 NOTE — PROGRESS NOTES
"  Assessment & Plan     Migraine with aura and without status migrainosus, not intractable  Chronic, not well controlled. Patient with migraines twice weekly, however, abortive therapy only partially effective. Discussed use of triptans and NSAIDs given infrequent episodes.   - naproxen (NAPROSYN) 500 MG tablet  Dispense: 60 tablet; Refill: 1  - SUMAtriptan (IMITREX) 50 MG tablet  Dispense: 16 tablet; Refill: 1  - ondansetron (ZOFRAN ODT) 4 MG ODT tab  Dispense: 20 tablet; Refill: 1    Lumbar back pain  Chronic, likely MSK related. Concerned for possible inflammatory process, XR as below.   - Chlamydia trachomatis/Neisseria gonorrhoeae by PCR - Clinic Collect  - XR Lumbar Spine 2/3 Views    Acne vulgaris  Chronic, not well controlled. Patient with inflammatory acne on cheeks, and some comedonal acne on the jaw line. Recommendations as below.   - clindamycin phos-benzoyl perox (DUAC) 1.2-5 % external gel  Dispense: 45 g; Refill: 5  - adapalene (DIFFERIN) 0.1 % external gel  Dispense: 45 g; Refill: 2    Seasonal allergic rhinitis due to pollen  Patient would like allergy testing.   - Adult Allergy/Asthma  Referral    Cervical cancer screening  - Pap Screen Only - Recommended Age 21 - 24 Years    Screening for STDs (sexually transmitted diseases)  See above    Encounter for vaccination  - Pneumococcal 20 Valent Conjugate (Prevnar 20)            BMI  Estimated body mass index is 27.88 kg/m  as calculated from the following:    Height as of this encounter: 1.56 m (5' 1.42\").    Weight as of this encounter: 67.9 kg (149 lb 9.6 oz).   Weight management plan: Discussed healthy diet and exercise guidelines      See Patient Instructions    Reyna Iglesias is a 24 year old, presenting for the following health issues:  Establish Care (Pt would like to do an allergy test ), Recheck Medication, and Back Pain (Discuss back pain )        8/20/2024     2:42 PM   Additional Questions   Roomed by Wang Sorto   Accompanied by " N/A         8/20/2024   Forms   Any forms needing to be completed Yes    No       Multiple values from one day are sorted in reverse-chronological order     History of Present Illness       Reason for visit:  Establishing a new care provider, yearly check up, other concerns I want to look into.    She eats 2-3 servings of fruits and vegetables daily.She consumes 1 sweetened beverage(s) daily.She exercises with enough effort to increase her heart rate 60 or more minutes per day.  She exercises with enough effort to increase her heart rate 3 or less days per week. She is missing 1 dose(s) of medications per week.  She is not taking prescribed medications regularly due to other.     Patient used to see Dr. Salmon and would like to establish care.     Migraine history  -Patient has a history of migraines since middle school.  -Will typically knock her out for two days due to pain.   -Patient is getting less frequently. Typically triggered with changes in weather.   -She does endorse aura.  -Patient has diaphoresis, dizzy, nauseated. +/- headache, typically will have the headache for a day. No vision changes, does see scintillating scotomas. No numbess, tingling, speech changes.   -This has evolved over time, high school migraines were more intense. She was placed on progestin only birth control with decreased frequency.   -She will take ibuprofen or tylenol during these episodes. Typically needs to sleep.     Back or Hip Pain:  -Feels like a pinch in the back, and cannot really bend.  -Symptoms ongoing since last year, on and off flares. No pain radiating or migrating anywhere else.   -Patient used to see chiropractor and was told she had uneven hip height  -Midline, towards the bottom some radiation  -Difficulty with bending and stiffness.   -Some improvement with stretches  -Has a job where she works 8 hours a day. Still has some pinching at rest.   -Still uncomfortable at night.     Acne  -Patient would like to discuss  "treatment options  -Mostly cheeks with post inflammatory erythema   -Patient currently uses cerave face wash, la rosche posay for moisturizer.   -Uses hyaluronic acid, snail mucin  -Sometimes gets cystic acne. Mild acne on shoulders.   -In the past she was prescribed tretinoin    Allergy testing  -Patient would like referral     She would like pap smear today. Wants routine STI testing.         Review of Systems  Constitutional, neuro, ENT, endocrine, pulmonary, cardiac, gastrointestinal, genitourinary, musculoskeletal, integument and psychiatric systems are negative, except as otherwise noted.      Objective    BP 98/62   Pulse 78   Temp 98.3  F (36.8  C) (Oral)   Resp 16   Ht 1.56 m (5' 1.42\")   Wt 67.9 kg (149 lb 9.6 oz)   LMP 08/13/2024 (Approximate)   SpO2 100%   BMI 27.88 kg/m    Body mass index is 27.88 kg/m .  Physical Exam  Genitourinary:     General: Normal vulva.      Vagina: Normal.      Cervix: Normal.        GENERAL: alert and no distress  EYES: Eyes grossly normal to inspection, PERRL and conjunctivae and sclerae normal  NECK: no adenopathy, no asymmetry, masses, or scars  MS: no gross musculoskeletal defects noted, no edema  SKIN: bilateral cheeks with scattered areas of erythematous papules, with various depressed scars. On jaw line, mostly erythematous papules.   BACK: no CVA tenderness, no paralumbar tenderness    XR Lumbar Spine 2/3 Views    Result Date: 8/21/2024  EXAM: XR LUMBAR SPINE 2/3 VIEWS LOCATION: [facility] DATE: 8/20/2024 INDICATION: axial skeletal pain with slight restriction in motion COMPARISON: None.     IMPRESSION: No fracture. Normal vertebral heights and alignment. Normal disc spaces and facets. Normal extraspinal structures.         Signed Electronically by: TRUONG GUERRA DO    "

## 2024-08-20 NOTE — PATIENT INSTRUCTIONS
Try the new migraine medication regimen that I prescribed and let me know if it A) decreased pain/intensity or B) shortened duration of headache/other migraines symptoms.   Start the antibiotic-BP at night, use the differin gel during the day.   Even with acne improvement (which does take 6-9 months), I would like you to keep doing the retinoid as it helps with acne scarring.   Get the X-ray of your back. If it is normal, we will go ahead I will recommend physical therapy first.

## 2024-08-21 LAB
C TRACH DNA SPEC QL PROBE+SIG AMP: NEGATIVE
N GONORRHOEA DNA SPEC QL NAA+PROBE: NEGATIVE

## 2024-08-21 NOTE — RESULT ENCOUNTER NOTE
Hello -    Here are my comments about your recent results:  -Chlamydia and gonnohrea tests are normal.  For additional lab test information, labtestsonline.org is an excellent reference..    Please let us know if you have any questions or concerns.     Regards,  TRUONG GUERRA, DO

## 2024-08-26 LAB
BKR LAB AP GYN ADEQUACY: NORMAL
BKR LAB AP GYN INTERPRETATION: NORMAL
BKR LAB AP HPV REFLEX: NO
BKR LAB AP LMP: NORMAL
BKR LAB AP PREVIOUS ABNORMAL: NORMAL
PATH REPORT.COMMENTS IMP SPEC: NORMAL
PATH REPORT.COMMENTS IMP SPEC: NORMAL
PATH REPORT.RELEVANT HX SPEC: NORMAL

## 2024-09-01 ENCOUNTER — OFFICE VISIT (OUTPATIENT)
Dept: FAMILY MEDICINE | Facility: CLINIC | Age: 24
End: 2024-09-01
Payer: COMMERCIAL

## 2024-09-01 VITALS
HEIGHT: 61 IN | WEIGHT: 150 LBS | RESPIRATION RATE: 15 BRPM | OXYGEN SATURATION: 100 % | SYSTOLIC BLOOD PRESSURE: 109 MMHG | TEMPERATURE: 98.2 F | DIASTOLIC BLOOD PRESSURE: 72 MMHG | BODY MASS INDEX: 28.32 KG/M2 | HEART RATE: 98 BPM

## 2024-09-01 DIAGNOSIS — J02.9 SORE THROAT: ICD-10-CM

## 2024-09-01 DIAGNOSIS — J06.9 VIRAL URI WITH COUGH: Primary | ICD-10-CM

## 2024-09-01 LAB
DEPRECATED S PYO AG THROAT QL EIA: NEGATIVE
GROUP A STREP BY PCR: NOT DETECTED

## 2024-09-01 PROCEDURE — 87651 STREP A DNA AMP PROBE: CPT | Performed by: PHYSICIAN ASSISTANT

## 2024-09-01 PROCEDURE — 99213 OFFICE O/P EST LOW 20 MIN: CPT | Performed by: PHYSICIAN ASSISTANT

## 2024-09-01 NOTE — LETTER
September 1, 2024      Kelsie Dobbs  [address] [address]        To Whom It May Concern:    Kelsie Dobbs  was seen on 9/1/24.  Please excuse her from work today 9/1/24 due to illness.        Sincerely,        ANDRÉS Maradiaga

## 2024-09-01 NOTE — PROGRESS NOTES
"  Assessment & Plan     Viral URI with cough  Discussed with patient that symptoms are likely related to a viral illness. This can take 7-10 days to resolve. I recommend conservative treatment with rest fluids, otc decongestants, and otc pain relievers as needed. If symptoms do not improve over this time then please follow up in clinic. Patient agree's with this plan and has no further questions    Sore throat  Strep is negative culture is pending  - Streptococcus A Rapid Screen w/Reflex to PCR - Clinic Collect  - Group A Streptococcus PCR Throat Swab                No follow-ups on file.    Subjective   Kelsie is a 24 year old, presenting for the following health issues:  Pharyngitis (Has had a sore throat since 8/29 and now has ear pain )    HPI       Acute Illness  Acute illness concerns: URI   Onset/Duration: 4 days   Symptoms:  Fever: No  Chills/Sweats: No  Headache (location?): YES - the first day  Sinus Pressure: YES- but mild  Conjunctivitis:  No  Ear Pain: YES- both - feels pressure like her ear has to pop  Rhinorrhea: YES  Congestion: YES - at night  Sore Throat: YES  Cough: YES-non-productive, sometimes feels like she needs to clear her throat  Wheeze: No  Decreased Appetite: No  Nausea: No  Vomiting: No  Diarrhea: No  Dysuria/Freq.: No  Dysuria or Hematuria: No  Fatigue/Achiness: YES  Sick/Strep Exposure: No  Therapies tried and outcome: otc cough medicine, ibuprofen and tylenol   Additional provider notes :   At home COVID test was negative      Review of Systems  Constitutional, HEENT, cardiovascular, pulmonary, gi and gu systems are negative, except as otherwise noted.      Objective    /72   Pulse 98   Temp 98.2  F (36.8  C) (Oral)   Resp 15   Ht 1.549 m (5' 1\")   Wt 68 kg (150 lb)   LMP 08/13/2024 (Approximate)   SpO2 100%   BMI 28.34 kg/m    Body mass index is 28.34 kg/m .  Physical Exam  Constitutional:       Appearance: Normal appearance.   HENT:      Right Ear: Ear canal normal. A " middle ear effusion is present.      Left Ear: Ear canal normal. A middle ear effusion is present.      Nose: Congestion present. No rhinorrhea.      Right Sinus: No maxillary sinus tenderness or frontal sinus tenderness.      Left Sinus: No maxillary sinus tenderness or frontal sinus tenderness.      Mouth/Throat:      Mouth: Mucous membranes are moist.      Pharynx: Oropharynx is clear. Posterior oropharyngeal erythema present.   Cardiovascular:      Rate and Rhythm: Normal rate and regular rhythm.   Pulmonary:      Effort: Pulmonary effort is normal.      Breath sounds: Normal breath sounds.   Lymphadenopathy:      Cervical: No cervical adenopathy.   Neurological:      Mental Status: She is alert.   Psychiatric:         Mood and Affect: Mood normal.         Behavior: Behavior normal.          Results for orders placed or performed in visit on 09/01/24   Streptococcus A Rapid Screen w/Reflex to PCR - Clinic Collect     Status: Normal    Specimen: Throat; Swab   Result Value Ref Range    Group A Strep antigen Negative Negative           Signed Electronically by: ANDRÉS Maradiaga

## 2024-09-25 ENCOUNTER — IMMUNIZATION (OUTPATIENT)
Dept: FAMILY MEDICINE | Facility: CLINIC | Age: 24
End: 2024-09-25
Payer: COMMERCIAL

## 2024-09-25 VITALS — TEMPERATURE: 98.5 F

## 2024-09-25 DIAGNOSIS — Z23 ENCOUNTER FOR IMMUNIZATION: Primary | ICD-10-CM

## 2024-09-25 PROCEDURE — 99207 PR NO CHARGE NURSE ONLY: CPT

## 2024-09-25 PROCEDURE — 90656 IIV3 VACC NO PRSV 0.5 ML IM: CPT

## 2024-09-25 PROCEDURE — 90471 IMMUNIZATION ADMIN: CPT

## 2024-09-25 NOTE — PROGRESS NOTES
Prior to immunization administration, verified patients identity using patient s name and date of birth. Please see Immunization Activity for additional information.     Is the patient's temperature normal (100.5 or less)? Yes     Patient MEETS CRITERIA. PROCEED with vaccine administration.      Patient instructed to remain in clinic for 15 minutes afterwards, and to report any adverse reactions.      Link to Ancillary Visit Immunization Standing Orders SmartSet     Screening performed by Bernarda Dockery MA on 9/25/2024 at 11:13 AM.

## 2024-10-05 ENCOUNTER — HEALTH MAINTENANCE LETTER (OUTPATIENT)
Age: 24
End: 2024-10-05

## 2024-10-20 SDOH — HEALTH STABILITY: PHYSICAL HEALTH: ON AVERAGE, HOW MANY DAYS PER WEEK DO YOU ENGAGE IN MODERATE TO STRENUOUS EXERCISE (LIKE A BRISK WALK)?: 4 DAYS

## 2024-10-20 SDOH — HEALTH STABILITY: PHYSICAL HEALTH: ON AVERAGE, HOW MANY MINUTES DO YOU ENGAGE IN EXERCISE AT THIS LEVEL?: 60 MIN

## 2024-10-20 ASSESSMENT — SOCIAL DETERMINANTS OF HEALTH (SDOH): HOW OFTEN DO YOU GET TOGETHER WITH FRIENDS OR RELATIVES?: ONCE A WEEK

## 2024-10-23 ENCOUNTER — OFFICE VISIT (OUTPATIENT)
Dept: FAMILY MEDICINE | Facility: CLINIC | Age: 24
End: 2024-10-23
Payer: COMMERCIAL

## 2024-10-23 VITALS
BODY MASS INDEX: 30.45 KG/M2 | TEMPERATURE: 98.3 F | WEIGHT: 161.3 LBS | SYSTOLIC BLOOD PRESSURE: 101 MMHG | OXYGEN SATURATION: 99 % | RESPIRATION RATE: 16 BRPM | HEART RATE: 59 BPM | DIASTOLIC BLOOD PRESSURE: 67 MMHG | HEIGHT: 61 IN

## 2024-10-23 DIAGNOSIS — N94.6 DYSMENORRHEA: ICD-10-CM

## 2024-10-23 DIAGNOSIS — Z00.00 ROUTINE GENERAL MEDICAL EXAMINATION AT A HEALTH CARE FACILITY: Primary | ICD-10-CM

## 2024-10-23 DIAGNOSIS — L70.0 ACNE VULGARIS: ICD-10-CM

## 2024-10-23 DIAGNOSIS — J45.30 MILD PERSISTENT ASTHMA WITHOUT COMPLICATION: ICD-10-CM

## 2024-10-23 PROCEDURE — 99395 PREV VISIT EST AGE 18-39: CPT | Mod: 25 | Performed by: FAMILY MEDICINE

## 2024-10-23 PROCEDURE — 99214 OFFICE O/P EST MOD 30 MIN: CPT | Mod: 25 | Performed by: FAMILY MEDICINE

## 2024-10-23 RX ORDER — FLUTICASONE PROPIONATE 50 MCG
2 BLISTER, WITH INHALATION DEVICE INHALATION EVERY 12 HOURS
Qty: 60 EACH | Refills: 4 | Status: SHIPPED | OUTPATIENT
Start: 2024-10-23

## 2024-10-23 RX ORDER — ACETAMINOPHEN AND CODEINE PHOSPHATE 120; 12 MG/5ML; MG/5ML
0.35 SOLUTION ORAL DAILY
Qty: 84 TABLET | Refills: 4 | Status: SHIPPED | OUTPATIENT
Start: 2024-10-23

## 2024-10-23 RX ORDER — FLUTICASONE PROPIONATE 50 MCG
1 SPRAY, SUSPENSION (ML) NASAL DAILY
Qty: 16 G | Refills: 11 | Status: SHIPPED | OUTPATIENT
Start: 2024-10-23

## 2024-10-23 RX ORDER — CLINDAMYCIN PHOSPHATE 10 UG/ML
LOTION TOPICAL DAILY
Qty: 60 ML | Refills: 0 | Status: SHIPPED | OUTPATIENT
Start: 2024-10-23

## 2024-10-23 ASSESSMENT — PAIN SCALES - GENERAL: PAINLEVEL_OUTOF10: NO PAIN (0)

## 2024-10-23 NOTE — PATIENT INSTRUCTIONS
Patient Education   Preventive Care Advice   This is general advice given by our system to help you stay healthy. However, your care team may have specific advice just for you. Please talk to your care team about your preventive care needs.  Nutrition  Eat 5 or more servings of fruits and vegetables each day.  Try wheat bread, brown rice and whole grain pasta (instead of white bread, rice, and pasta).  Get enough calcium and vitamin D. Check the label on foods and aim for 100% of the RDA (recommended daily allowance).  Lifestyle  Exercise at least 150 minutes each week  (30 minutes a day, 5 days a week).  Do muscle strengthening activities 2 days a week. These help control your weight and prevent disease.  No smoking.  Wear sunscreen to prevent skin cancer.  Have a dental exam and cleaning every 6 months.  Yearly exams  See your health care team every year to talk about:  Any changes in your health.  Any medicines your care team has prescribed.  Preventive care, family planning, and ways to prevent chronic diseases.  Shots (vaccines)   HPV shots (up to age 26), if you've never had them before.  Hepatitis B shots (up to age 59), if you've never had them before.  COVID-19 shot: Get this shot when it's due.  Flu shot: Get a flu shot every year.  Tetanus shot: Get a tetanus shot every 10 years.  Pneumococcal, hepatitis A, and RSV shots: Ask your care team if you need these based on your risk.  Shingles shot (for age 50 and up)  General health tests  Diabetes screening:  Starting at age 35, Get screened for diabetes at least every 3 years.  If you are younger than age 35, ask your care team if you should be screened for diabetes.  Cholesterol test: At age 39, start having a cholesterol test every 5 years, or more often if advised.  Bone density scan (DEXA): At age 50, ask your care team if you should have this scan for osteoporosis (brittle bones).  Hepatitis C: Get tested at least once in your life.  STIs (sexually  transmitted infections)  Before age 24: Ask your care team if you should be screened for STIs.  After age 24: Get screened for STIs if you're at risk. You are at risk for STIs (including HIV) if:  You are sexually active with more than one person.  You don't use condoms every time.  You or a partner was diagnosed with a sexually transmitted infection.  If you are at risk for HIV, ask about PrEP medicine to prevent HIV.  Get tested for HIV at least once in your life, whether you are at risk for HIV or not.  Cancer screening tests  Cervical cancer screening: If you have a cervix, begin getting regular cervical cancer screening tests starting at age 21.  Breast cancer scan (mammogram): If you've ever had breasts, begin having regular mammograms starting at age 40. This is a scan to check for breast cancer.  Colon cancer screening: It is important to start screening for colon cancer at age 45.  Have a colonoscopy test every 10 years (or more often if you're at risk) Or, ask your provider about stool tests like a FIT test every year or Cologuard test every 3 years.  To learn more about your testing options, visit:   .  For help making a decision, visit:   https://bit.ly/ka77147.  Prostate cancer screening test: If you have a prostate, ask your care team if a prostate cancer screening test (PSA) at age 55 is right for you.  Lung cancer screening: If you are a current or former smoker ages 50 to 80, ask your care team if ongoing lung cancer screenings are right for you.  For informational purposes only. Not to replace the advice of your health care provider. Copyright   2023 [place] [employer] All rights reserved. Clinically reviewed by the [facility]. [place] 265151 - REV 01/24.

## 2024-10-23 NOTE — PROGRESS NOTES
Preventive Care Visit  [facility]  TRUONG GUERRA DO, Family Medicine  Oct 23, 2024    Assessment & Plan     Routine general medical examination at a health care facility  Patient well appearing. Family history of lung cancer in maternal side, but could be possibly occupational related.     Dysmenorrhea  Chronic, stable.   - norethindrone (MICRONOR) 0.35 MG tablet  Dispense: 84 tablet; Refill: 4    Mild persistent asthma without complication  Chronic, stable.   - fluticasone (FLONASE) 50 MCG/ACT nasal spray  Dispense: 16 g; Refill: 11  - fluticasone (FLOVENT DISKUS) 50 MCG/ACT inhaler  Dispense: 60 each; Refill: 4    Acne vulgaris  Chronic, stable.   - clindamycin (CLEOCIN T) 1 % external lotion  Dispense: 60 mL; Refill: 0      Patient has been advised of split billing requirements and indicates understanding: Yes        Counseling  Appropriate preventive services were addressed with this patient via screening, questionnaire, or discussion as appropriate for fall prevention, nutrition, physical activity, Tobacco-use cessation, social engagement, weight loss and cognition.  Checklist reviewing preventive services available has been given to the patient.  Reviewed patient's diet, addressing concerns and/or questions.   The patient was instructed to see the dentist every 6 months.   She is at risk for psychosocial distress and has been provided with information to reduce risk.       See Patient Instructions    Reyna Iglesias is a 24 year old, presenting for the following:  Physical        10/23/2024    12:47 PM   Additional Questions   Roomed by CYNTHIA Gutierrez   Accompanied by Self          HPI      Health Care Directive  Patient does not have a Health Care Directive: Advance Directive received and scanned. Click on Code in the patient header to view.      10/20/2024   General Health   How would you rate your overall physical health? Good   Feel stress (tense, anxious, or unable to  sleep) Only a little      (!) STRESS CONCERN      10/20/2024   Nutrition   Three or more servings of calcium each day? Yes   Diet: Regular (no restrictions)   How many servings of fruit and vegetables per day? (!) 2-3   How many sweetened beverages each day? 0-1            10/20/2024   Exercise   Days per week of moderate/strenous exercise 4 days   Average minutes spent exercising at this level 60 min            10/20/2024   Social Factors   Frequency of gathering with friends or relatives Once a week   Worry food won't last until get money to buy more No   Food not last or not have enough money for food? No   Do you have housing? (Housing is defined as stable permanent housing and does not include staying ouside in a car, in a tent, in an abandoned building, in an overnight shelter, or couch-surfing.) Yes   Are you worried about losing your housing? No   Lack of transportation? No   Unable to get utilities (heat,electricity)? No            10/20/2024   Dental   Dentist two times every year? (!) NO            10/20/2024   TB Screening   Were you born outside of the [place]? No              Today's PHQ-2 Score:       8/19/2024     7:07 PM   PHQ-2 ( 1999 [employer])   Q1: Little interest or pleasure in doing things 1    Q2: Feeling down, depressed or hopeless 1    PHQ-2 Score 2   Q1: Little interest or pleasure in doing things Several days   Q2: Feeling down, depressed or hopeless Several days   PHQ-2 Score 2       Patient-reported         10/20/2024   Substance Use   Alcohol more than 3/day or more than 7/wk No   Do you use any other substances recreationally? No        Social History     Tobacco Use    Smoking status: Never     Passive exposure: Never    Smokeless tobacco: Never   Vaping Use    Vaping status: Never Used   Substance Use Topics    Alcohol use: Never    Drug use: Never             10/20/2024   One time HIV Screening   Previous HIV test? No          10/20/2024   STI Screening   New sexual partner(s) since last  "STI/HIV test? No        History of abnormal Pap smear: No - age 21-29 PAP every 3 years recommended        8/20/2024     3:17 PM   PAP / HPV   PAP Negative for Intraepithelial Lesion or Malignancy (NILM)            10/20/2024   Contraception/Family Planning   Questions about contraception or family planning No           Reviewed and updated as needed this visit by Provider   Tobacco   Meds  Problems  Med Hx  Surg Hx  Fam Hx            Past Medical History:   Diagnosis Date    Varicella 08/28/2003    3/30/07 - serology immune     History reviewed. No pertinent surgical history.      Review of Systems  Constitutional, neuro, ENT, endocrine, pulmonary, cardiac, gastrointestinal, genitourinary, musculoskeletal, integument and psychiatric systems are negative, except as otherwise noted.     Objective    Exam  /67 (BP Location: Right arm, Patient Position: Sitting, Cuff Size: Adult Regular)   Pulse 59   Temp 98.3  F (36.8  C) (Oral)   Resp 16   Ht 1.549 m (5' 1\")   Wt 73.2 kg (161 lb 4.8 oz)   SpO2 99%   BMI 30.48 kg/m     Estimated body mass index is 30.48 kg/m  as calculated from the following:    Height as of this encounter: 1.549 m (5' 1\").    Weight as of this encounter: 73.2 kg (161 lb 4.8 oz).    Physical Exam  GENERAL: alert and no distress  EYES: Eyes grossly normal to inspection, PERRL and conjunctivae and sclerae normal  HENT: ear canals and TM's normal, nose and mouth without ulcers or lesions  NECK: no adenopathy, no asymmetry, masses, or scars  RESP: lungs clear to auscultation - no rales, rhonchi or wheezes  CV: regular rate and rhythm, normal S1 S2, no S3 or S4, no murmur, click or rub, no peripheral edema  ABDOMEN: soft, nontender, no hepatosplenomegaly, no masses and bowel sounds normal  MS: no gross musculoskeletal defects noted, no edema  SKIN: no suspicious lesions or rashes  PSYCH: mentation appears normal, affect normal/bright        Signed Electronically by: TRUONG FERNANDEZ" XI-DO CHEYENNE

## 2024-10-23 NOTE — LETTER
My Asthma Action Plan    Name: Kelsie Dobbs   YOB: 2000  Date: 10/23/2024   My doctor: TRUONG GUERRA DO   My clinic: [facility]        My Control Medicine: Fluticasone propionate (Flovent Diskus) - 50 mcg q12 hr  My Rescue Medicine: Albuterol (Proair/Ventolin/Proventil HFA) 2-4 puffs EVERY 4 HOURS as needed. Use a spacer if recommended by your provider.  My Oral Steroid Medicine: Prednisone 40 mg PO every day for five days or longer if needed My Asthma Severity:   Mild Persistent  Know your asthma triggers: cold air               GREEN ZONE   Good Control  I feel good  No cough or wheeze  Can work, sleep and play without asthma symptoms       Take your asthma control medicine every day.     If exercise triggers your asthma, take your rescue medication  15 minutes before exercise or sports, and  During exercise if you have asthma symptoms  Spacer to use with inhaler: If you have a spacer, make sure to use it with your inhaler             YELLOW ZONE Getting Worse  I have ANY of these:  I do not feel good  Cough or wheeze  Chest feels tight  Wake up at night   Keep taking your Green Zone medications  Start taking your rescue medicine:  every 20 minutes for up to 1 hour. Then every 4 hours for 24-48 hours.  If you stay in the Yellow Zone for more than 12-24 hours, contact your doctor.  If you do not return to the Green Zone in 12-24 hours or you get worse, start taking your oral steroid medicine if prescribed by your provider.           RED ZONE Medical Alert - Get Help  I have ANY of these:  I feel awful  Medicine is not helping  Breathing getting harder  Trouble walking or talking  Nose opens wide to breathe       Take your rescue medicine NOW  If your provider has prescribed an oral steroid medicine, start taking it NOW  Call your doctor NOW  If you are still in the Red Zone after 20 minutes and you have not reached your doctor:  Take your rescue medicine again  and  Call 911 or go to the emergency room right away    See your regular doctor within 2 weeks of an Emergency Room or Urgent Care visit for follow-up treatment.          Annual Reminders:  Meet with Asthma Educator,  Flu Shot in the Fall, consider Pneumonia Vaccination for patients with asthma (aged 19 and older).    Pharmacy:    [id number] - [place], [place] - [address] AT [facility] [address] [facility] - [place], [place] - [address] [place] [address] [address]    Electronically signed by TRUONG GUERRA DO   Date: 10/23/24                      Asthma Triggers  How To Control Things That Make Your Asthma Worse    Triggers are things that make your asthma worse.  Look at the list below to help you find your triggers and what you can do about them.  You can help prevent asthma flare-ups by staying away from your triggers.      Trigger                                                          What you can do   Cigarette Smoke  Tobacco smoke can make asthma worse. Do not allow smoking in your home, car or around you.  Be sure no one smokes at a child s day care or school.  If you smoke, ask your health care provider for ways to help you quit.  Ask family members to quit too.  Ask your health care provider for a referral to Quit Plan to help you quit smoking, or call 8-830-774-PLAN.     Colds, Flu, Bronchitis  These are common triggers of asthma. Wash your hands often.  Don t touch your eyes, nose or mouth.  Get a flu shot every year.     Dust Mites  These are tiny bugs that live in cloth or carpet. They are too small to see. Wash sheets and blankets in hot water every week.   Encase pillows and mattress in dust mite proof covers.  Avoid having carpet if you can. If you have carpet, vacuum weekly.   Use a dust mask and HEPA vacuum.   Pollen and Outdoor Mold  Some people are allergic to trees, grass, or weed pollen, or molds. Try to keep your windows closed.  Limit time out doors when  pollen count is high.   Ask you health care provider about taking medicine during allergy season.     Animal Dander  Some people are allergic to skin flakes, urine or saliva from pets with fur or feathers. Keep pets with fur or feathers out of your home.    If you can t keep the pet outdoors, then keep the pet out of your bedroom.  Keep the bedroom door closed.  Keep pets off cloth furniture and away from stuffed toys.     Mice, Rats, and Cockroaches   Some people are allergic to the waste from these pests.   Cover food and garbage.  Clean up spills and food crumbs.  Store grease in the refrigerator.   Keep food out of the bedroom.   Indoor Mold  This can be a trigger if your home has high moisture. Fix leaking faucets, pipes, or other sources of water.   Clean moldy surfaces.  Dehumidify basement if it is damp and smelly.   Smoke, Strong Odors, and Sprays  These can reduce air quality. Stay away from strong odors and sprays, such as perfume, powder, hair spray, paints, smoke incense, paint, cleaning products, candles and new carpet.   Exercise or Sports  Some people with asthma have this trigger. Be active!  Ask your doctor about taking medicine before sports or exercise to prevent symptoms.    Warm up for 5-10 minutes before and after sports or exercise.     Other Triggers of Asthma  Cold air:  Cover your nose and mouth with a scarf.  Sometimes laughing or crying can be a trigger.  Some medicines and food can trigger asthma.

## 2024-11-08 ENCOUNTER — LAB (OUTPATIENT)
Dept: LAB | Facility: CLINIC | Age: 24
End: 2024-11-08
Payer: COMMERCIAL

## 2024-11-08 ENCOUNTER — OFFICE VISIT (OUTPATIENT)
Dept: ALLERGY | Facility: CLINIC | Age: 24
End: 2024-11-08
Attending: FAMILY MEDICINE
Payer: COMMERCIAL

## 2024-11-08 VITALS — OXYGEN SATURATION: 99 % | HEART RATE: 84 BPM | BODY MASS INDEX: 27.42 KG/M2 | HEIGHT: 62 IN | WEIGHT: 149 LBS

## 2024-11-08 DIAGNOSIS — J30.1 SEASONAL ALLERGIC RHINITIS DUE TO POLLEN: ICD-10-CM

## 2024-11-08 DIAGNOSIS — L30.9 DERMATITIS: Primary | ICD-10-CM

## 2024-11-08 PROCEDURE — 99204 OFFICE O/P NEW MOD 45 MIN: CPT

## 2024-11-08 PROCEDURE — 82785 ASSAY OF IGE: CPT

## 2024-11-08 PROCEDURE — 86003 ALLG SPEC IGE CRUDE XTRC EA: CPT | Mod: 59

## 2024-11-08 PROCEDURE — 86003 ALLG SPEC IGE CRUDE XTRC EA: CPT

## 2024-11-08 PROCEDURE — 36415 COLL VENOUS BLD VENIPUNCTURE: CPT

## 2024-11-08 RX ORDER — TRIAMCINOLONE ACETONIDE 1 MG/G
OINTMENT TOPICAL 2 TIMES DAILY
Qty: 80 G | Refills: 3 | Status: SHIPPED | OUTPATIENT
Start: 2024-11-08

## 2024-11-08 NOTE — PATIENT INSTRUCTIONS
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  They progress over 12 hours and may resolve within 3-10 days.  The prognosis is favorable.  Usually, the severity of symptoms gets better with age.  However, the time to resolution varies depending on the frequency and intensity of the patient's exposure to mosquitoes.  Diagnosis is based on history and physical exam.  Laboratory testing is not commercially available.    Skin testing is not practical.  Commercially available mosquito reagents are not standardized and contain minimal mosquito salivary allergens.    Prevention measure, in the summer begin taking daily non-sedating antihistamine, cetirizine (Zyrtec) 10 mg, levocetirizine (Xyzal) 5 mg, or fexofenadine (Allegra) 180 mg.     Take over-the-counter pain relievers/fever reducers such as acetaminophen or ibuprofen if you have pain or fever, or both if needed.   Mosquito avoidance and using DEET is important. DEET in a concentration of 10% can be applied safely to the skin.  To relieve itching, redness, and swelling, triamcinolone 0.1% ointment could be used.  I prescribed to the patient.    The best way to prevent quinten syndrome is to prevent mosquito bites.  Eliminating any standing water.  Avoiding areas infested with mosquitoes.  Using a bug spray registered with the Environmental Protection Agency (typically containing DEET).  Wearing long pants and long sleeves. Wear thick clothes if you can because mosquitoes can bite through thin clothes.  Using screens to cover windows and doors.  Staying indoors when mosquitoes are most active (dusk and jamee).  Treating your clothing, tents and nets with mosquito repellants.  Covering your sleeping area with protective nets.    [email or web address]-syndrome       Azelastine  (Astepro ) nasal spray, 1-2 sprays in each nostril twice daily as needed.  This medication may be bitter taste, please consider brushing your teeth after using this nasal spray.     If you feel like azelastine nasal spray is not adequately controlling your nasal symptoms, start intranasal fluticasone (Flonase) 1-2 sprays in each nostril once daily.     Cetirizine (Zyrtec) 10 mg, levocetirizine (Xyzal) 5 mg, or fexofenadine (Allegra) 180 mg    Pataday (olapatadine) 1 drop/eye daily as needed.  Keep this medication in the refrigerator for comfort of your eyes.  If you wear contacts, please wait at least 10 minutes before placing the contacts into your eye.     NASAL SALINE IRRIGATION INSTRUCTIONS     You will be starting nasal saline irrigations and will need to obtain the following:       - NeilMed Sinus Rinse 8 oz Kit (or prefer device)    - Distilled or filtered water   - Normal saline salt packets     Place filtered or distilled water into the NeilMed bottle up to the fill line (DO NOT USE TAP OR WELL WATER).  Place the pre-made salt packet in the 8 oz of saline.  Shake the bottle to suspend into solution.  Lean head forward over a sink or a basin.  Rinse each side of the nose with one-half of the bottle (each squeeze is about one-half of the bottle). Rinse the nose daily.      If you use topical nasal sprays, apply following irrigation.     Video example: https://www.youtube.com/watch?v=ZC7naGh8Kr7      SINUS SALINE RINSE RECIPE    This can be completed 1-2 times daily, or as needed    Ingredients  1.  Pickling or hillary salt-containing no iodide, anti-caking agents or preservatives. Do not use other salt such as table salt as these can be irritating to the nasal lining  2.  Baking soda  3.  8 ounces (1 cup) of lukewarm distilled or boiled water    In a clean container, mix 3 teaspoons of iodide-free salt with 1 teaspoon of baking soda and store in a small airtight container. Add 1 teaspoon of the mixture to 8  ounces (1 cup) of lukewarm distilled or boiled water. Use less dry ingredients to make a weaker solution if burning or stinging is experienced.    Notes - if you are boiling tap water, cool water prior to use to prevent injury.   - do not use tap water unless it has been sterilized by boiling water prior to use.       If nasal saline irrigation is not tolerated, nasal saline sprays could be beneficial to remove allergens from the mucous membrane.    AEROALLERGEN AVOIDANCE INSTRUCTIONS  MOLD  Indoors, mold season is year round. Outdoors, most mold prefer seasons with high humidity. Mold prefers damp, dark, warm places. Here are some tips on how to avoid mold exposure.   Keep humidity inside between 35-50% with air conditioning or dehumidifier. The humidity level can be checked with a meter from a hardware store.    Clean surfaces where mold grows and dry wet areas.   Avoid steam cleaning carpets and discard moldy belongings.   Wear a mask when doing yard work and refrain from walking through uncut fields or playing in leaves.   Minimize use of potted plants and do not keep them indoors.   Consider an allergy cover for the pillow and mattress.  POLLEN  Pollens are the tiny airborne particles given off by trees, weeds, and grasses. They can be the cause of seasonal allergic rhinitis or hay fever symptoms, which include stuffy, itchy, runny nose, redness, swelling and itching of the eyes, and itching of the ears and throat. Here are some tips on how to avoid pollen exposure.  Keep windows closed and use the air conditioner when possible.   Avoid outside exposure in the early morning as pollen counts are highest at that time.   Take a shower and wash hair each night.   Consider wearing a mask when working in the yard and/or garden.   Clean furnace filter monthly with HEPA filters. Consider a HEPA filter vacuum  which will prevent pollen from being reintroduced into the air.   DUST MITES  Dust mites can never be  entirely eliminated in the house no matter how clean your house is. Dust mites are attracted to warm, moist areas and feed on dead skin flakes. Here are tips to minimize dust mites in your home.   Encase pillows and mattress/box springs in zippered allergy covers.   Wash bedding in hot water (at least 130 F) every 7-14 days.   Avoid curtains, carpet, and upholstered furniture if possible.   Use HEPA air filters and a HEPA filter vacuum . Change filters monthly. Vacuum weekly.   Keep bedroom simple, avoiding clutter, so it can quickly be dusted.   Cover heating vents with vent filters.   Keep stuffed toys in a closed container and wash or freeze regularly.   Keep clothing in the closet with the door closed.   Keep your home with humidity between 40 and 50%.   PETS  Pets present many problems for people with allergies. Dander from pets is very difficult to remove and also is a food source for dust mites.   If possible, find the pet a new home.   If not possible, keep the pet outdoors. Never allow the pet into the bedroom.   Wash pet weekly in warm water.   Encase mattresses, pillows, and box springs in allergen-proof covers.   Use HEPA air filters and a HEPA filter vacuum . Change filters monthly.     Common seasonal allergens:  Tree pollens ? Early to late spring  Grass pollens ? Late spring to early summer  Weed pollens ? Early summer through fall  Molds ? Present year round in many climates, but significant primarily when it is warm, humid and wet  Common perennial (year?round) allergens:  Dust mites ? a very common indoor allergen that causes significant allergy in most of the [place]; highest allergen levels are in the bed  Pet dander ? cat and dog are the most common, but anything with fur or hair can cause allergies. Immunotherapy has only been shown to work well for cat and dog.      IMMUNOTHERAPY:    Background: Immunotherapy (allergy shots) is a long-term approach to decrease allergic  "sensitivity. It is the only therapy that has the possibility of making an individual less allergic to the items for which they will be treated. It does not provide immediate symptom relief. It takes time for the body to adjust to the new (immunologic) challenges to which we are exposing it. Patients frequently need to continue to use medications along with immunotherapy to control their allergy symptoms, at least through the building process.    Goals: Over the 3-5 year course of the shots, we aim to be able to minimize medications and to eventually develop a long-term tolerance to the injected proteins that will allow us to stop the injections and have the patient maintain control of their symptoms for years or decades without getting them any longer.    The Injections are Individualized: Immunotherapy involves administering a customized preparation based on your allergy test results. You are only treated with items to which you are sensitive.    Time Course: Improved symptom control is generally first noticed after 6-12 months on the allergy injections; this occurs in ~70-90% of individuals. The majority of patients are able to stop the injections after 3-5 years and are able to maintain long term benefit.    Frequency of Injections: During the first 6-8 months, we recommend that you come weekly while we increase the dose or concentration in a stepwise manner. You may get shots during this \"building phase\" every 3-14 days. Once you have reached the \"maintainence dose\" we will extend out the time in between the injections. Eventually, the time interval may gradually extended out to every 4 weeks for the inhaled allergens.    Number of Injections/Serums: You will receive anywhere from 1 to 4 injections per visit, depending on your sensitivities and the number of allergens we can include in each serum to maintain an appropriate therapeutic dose.    Preventing Reactions: Taking an antihistamine such as Levocetirizine " "(Xyzal), Cetirizine (Zyrtec) or Fexofenadine (Allergra) 30 minutes prior to each allergy shot injection can decrease the local shot reactions and likely also minimize the risk for systemic reactions as well. This \"Pre-Treatment\" for the allergy shots is recommended for all people starting Immunotherapy injections.    Timing of Shot Reactions & the Waiting Period: If a severe reaction were to occur it is most likely to occur fairly quickly after an allergy shot. Over 90% of these reactions will occur within the first 30 minutes after the injection. This is the rationale for having you wait in clinic for 30 minutes after an allergy shot. You may also be required to have a prescription for an epinephrine auto-injector which you will need to bring with you to the clinic for each injection visit.    Notify Us of Any Shot Reactions: If you have any new symptoms or \"feel different\" after your allergy shot, it is crucial that you notify the nurse of your symptoms immediately. This will allow us to determine how to proceed with your treatment in the safest manner possible.    Local Shot Reactions: These injections frequently cause redness, warmth, swelling or itching at the injection site as we are injecting into your skin the things to which you are allergic. If this reaction is transient (lasting less than 24 hours) no dosage adjustment is necessary. If it lasts more than 24 hours, or is very large, your building schedule may be modified to decrease the risk of a systemic reaction.    Treatment of Local Reactions: Local reactions are a common event with the allergy shots, and there are a few things that you can do to help prevent and treat these reactions. After you get the injection immediately. 1) Apply ice/cold packs. Let the shot nurse know that you would like a cold pack and they will try to arrange this for you. 2) Apply a steroid Cream or Ointment to the site of the shot to help prevent inflammation or treat it if " it has already developed.     Systemic Shot Reactions: Reactions to immunotherapy may not be limited to the injection site and may include nose or eyes symptoms consistent with increased allergy symptoms, a tickle in the throat, throat discomfort, difficulty speaking or swallowing, coughing, wheezing, asthma attack, nausea, vomiting, cramping, severe abdominal pain, or uterine cramps in women. Additional risks include laryngeal spasm and edema (airway narrowing), shock and decreased heart function. These severe and multi-system reactions are called anaphylaxis and are medical emergency.     Treatment of Systemic Reactions: Once our staff is aware of any symptoms that may have developed, you will be evaluated and treatment will be instituted as necessary. This may include antihistamines, subcutaneous or intramuscular injection of epinephrine, as well as inhaled or nebulized therapy. These treatments are not optional. Overwhelming evidence has shown that minimal treatment or delay in initiating treatment increases the risk of death from anaphylaxis. Due to the risk that our clinic incurs by administering allergy shots, if treatments for a reaction are absolutely refused or if you do not stay for the FULL 30 MINUTE waiting period, the option of receiving the allergy shots is forfeited, and you will not be able to receive any further injections (further notification, aside from this statement, may not be given).    Beta-Blockers & Immunotherapy: If another physician has prescribed or wants to start a medication for your heart/blood pressure called a beta-blocker (i.e. atenolol, metoprolol, carvedilol, etc.) this may need to be changed or stopped while you are on Immunotherapy as it can interfere with treating severe reactions. It is your responsibility to discuss this with the physician who ordered the medication prior to notifying us that you would like to start the injections. If we discover that you are receiving  Immunotherapy injections and taking a Beta-Blocker, we will hold off on administering any further shots until you have discussed this with the prescribing physician and your allergist.    Yearly Billing & Cost: Your entire series of shots for the next 6-12 months are generated when the serums are prepared. This is generally a significant cost. Once the serums are generated they cannot be returned. Therefore, we recommend that every patient check with their health insurance company to find out what your out-of-pocket expenses would be. If your insurance plan has a deductible, you may be responsible for the entire cost out of pocket until your deductible is met. Your insurance company will have a patient  that is available to discuss your specific situation and we encourage you to utilize them if you have any questions or concerns. When your serums are due to be refilled you will again be billed for 6-12 months of treatment at once.    Cluster Protocols: Some individuals may want to try to get through the build up period faster. This may be able to be achieved using a Cluster Protocol, where multiple injections are given on one day. The waiting period is still required after each injection, so a longer period is spent during these injection visits (plan on ~2-3 hours each day). There is also a greater risk for local reactions with this process. You can discuss with your allergist whether this protocol is appropriate for you, and if you would like to try it, include this in the message you leave for the shot nurse when notifying us that you want to start the injections.    Starting Allergy Shots: As there are many decisions and moving parts that go into starting Immunotherapy injections, as a rule we do not start the shots after the first visit. We want you to be comfortable committing to this, as there is a large time commitment involved, and erratic adherence to the shot schedule will only end  with disappointing results. If you have decided to go ahead with the Immunotherapy injections, send a message through [employer] or call the clinic to schedule an immunotherapy consult appointment. You will need to sign a consent form in the clinic before we can proceed with treatment.     We will need you to let us know 2 things:  1. That you called your insurance company to review your insurance coverage and agree to any out of pocket costs that may arise from the treatment  2. If you want to do the Cluster Protocol or the Standard protocol      These are the billing and diagnosis codes that your insurance company may need to help you determine if allergy shots are covered and what potential out of pocked costs you may have. You may also want to contact the [place] [facility]" Office/Cost of Care Estimate Line at [phone] for more information.      Traditional Immunotherapy = [id number]   - Billed each visit to allergy shot clinic    Cluster/Rapid Immunotherapy = 89675  - Billed hourly at each visit, number of units billed depends on the length of your visit. (cluster build is typically a minimum of 10 total units over multiple visits)    Allergy Shot Serum: 36275 - the amount of serum in total varies depending on how many allergy shots you will need. At the start of treatment, each injection is billed for 30 units. Treatment consists of a minimum of 1 injection up to a maximum of 4 injections depending on how many allergens are included in the treatment.  (1 injection/serum = 30 units) (2 injections/serums = 60 units) (3 injections/serums = 90 units) (4 injections/serums = 120 units)      Potential Diagnosis Codes:    Allergic Rhinitis Due to Dust Mite or Mold - J30.89  Allergic Rhinitis Due to Animals - J30.81  Seasonal Allergic Rhinitis Due to Pollens - J30.1  Allergic Conjunctivitis - H10.13

## 2024-11-08 NOTE — PROGRESS NOTES
Kelsie Dobbs was seen in the Allergy Clinic at [facility].    Kelsie Dobbs is a 24 year old female  being seen today for ongoing evaluation of concerns for seasonal allergic rhinitis.  Referral from,   Monserrat Garcia DO [facility] FAMILY MEDICINE/OB     History of Present Illness:     Rhinoconjunctivitis    The onset of symptoms: childhood, worse since [employer]   Perennial/seasonally-exacerbated (Spring, Fall, and Perennial) chronic nasal symptoms (itch, clear rhinorrhea, stuffiness, and sneezing), postnasal drip and ocular symptoms (itching, redness, and watering).  Patient reports she has trialed a variety of antihistamines, with mild symptom improvement.  She has been on daily antihistamines for many years, they are only partially effective.  There is no history of PE tubes, sinus surgeries, or tonsillectomy/adenoidectomy.   Patient reports she has a past medical history of asthma, that is currently well-controlled.  Peq New Allergy And Asthma    Question 11/6/2024  7:32 PM CST - Filed by Patient   Reason for visit: Other   Please specify: Allergy test   Environmental and Social History    Place of Residence: House   Do you have Central Air Conditioning? Yes   Type of Heating System: Forced air   Wood burning stove or fireplace: No   Occupation:    Pets: Yes   Number and type of pets: 2, dog and hermit crabs   Do you smoke cigarettes: No   Do you use an e-cigarette or vape? No   Does anyone living in your home smoke or vape? No   Family History    Do your parents, siblings, or children have asthma? Yes   Who? Siblings   Do your parents, siblings, or children have environmental allergies? Yes   Who? Siblings   Do your parents, siblings, or children have food allergies? No   Do your parents, siblings, or children have eczema? Yes   Who? Siblings, nephews, nieces       Past Medical History:   Diagnosis Date    Varicella 08/28/2003    3/30/07 - serology  immune       No past surgical history on file.      Current Outpatient Medications:     adapalene (DIFFERIN) 0.1 % external gel, Apply topically at bedtime, Disp: 45 g, Rfl: 2    albuterol (PROAIR HFA) 108 (90 Base) MCG/ACT inhaler, Inhale 2 puffs 15 minutes before exercise and every 4 hours as needed for cough or wheeze, Disp: 8.5 g, Rfl: 3    clindamycin (CLEOCIN T) 1 % external lotion, Apply topically daily., Disp: 60 mL, Rfl: 0    fluticasone (FLONASE) 50 MCG/ACT nasal spray, Spray 1 spray into both nostrils daily., Disp: 16 g, Rfl: 11    fluticasone (FLOVENT DISKUS) 50 MCG/ACT inhaler, Inhale 2 puffs into the lungs every 12 hours., Disp: 60 each, Rfl: 4    loratadine (CLARITIN) 10 MG tablet, Take 1 tablet (10 mg) by mouth daily, Disp: 90 tablet, Rfl: 3    naproxen (NAPROSYN) 500 MG tablet, Take 1 tablet (500 mg) by mouth 2 times daily (with meals) Take at first sign of migraine, do not take longer than 3 days in a row, Disp: 60 tablet, Rfl: 1    norethindrone (MICRONOR) 0.35 MG tablet, Take 1 tablet (0.35 mg) by mouth daily., Disp: 84 tablet, Rfl: 4    ondansetron (ZOFRAN ODT) 4 MG ODT tab, Take 1 tablet (4 mg) by mouth every 8 hours as needed for nausea, Disp: 20 tablet, Rfl: 1    SUMAtriptan (IMITREX) 50 MG tablet, Take 1 tablet (50 mg) by mouth at onset of headache for migraine May repeat in 2 hours. Max 4 tablets/24 hours., Disp: 16 tablet, Rfl: 1  No Known Allergies      Family History   Problem Relation Age of Onset    Lung Cancer Mother 49        Dx in 2017,  5 months later. Not smoking related    Early Death Mother     Other Cancer Mother     Asthma Sister        EXAM:   There were no vitals taken for this visit.    Physical Exam  Constitutional:       General: She is not in acute distress.     Appearance: Normal appearance. She is not ill-appearing.   HENT:      Nose: Rhinorrhea present. No nasal deformity, septal deviation or congestion.      Right Turbinates: Not enlarged, swollen or pale.       Left Turbinates: Not enlarged, swollen or pale.      Mouth/Throat:      Mouth: Mucous membranes are moist.      Pharynx: Oropharynx is clear. Uvula midline. No posterior oropharyngeal erythema.      Tonsils: 0 on the right. 0 on the left.   Eyes:      General: No allergic shiner.        Right eye: No discharge.         Left eye: No discharge.      Conjunctiva/sclera: Conjunctivae normal.   Cardiovascular:      Rate and Rhythm: Normal rate and regular rhythm.      Heart sounds: Normal heart sounds, S1 normal and S2 normal.   Pulmonary:      Effort: Pulmonary effort is normal. No prolonged expiration.      Breath sounds: Normal breath sounds. No decreased air movement. No wheezing.   Neurological:      Mental Status: She is alert.   Psychiatric:         Mood and Affect: Mood normal.         Behavior: Behavior normal.         Judgment: Judgment normal.         WORKUP: IgE laboratory evaluation for environmental allergies, patient takes daily antihistamines.    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    Rhinoconjunctivitis  We will be in touch via [employer], after patient has completed laboratory evaluation/IgE testing for environmental allergies.  Azelastine (Astepro ) nasal spray, 1-2 sprays in each nostril twice daily as needed.  This medication may be better taste, please consider brushing your teeth after using this nasal spray.     If you feel like azelastine nasal spray is not adequately controlling your nasal symptoms, start intranasal fluticasone (Flonase) 1-2 sprays in each nostril once daily.     Cetirizine (Zyrtec) 10 mg, levocetirizine (Xyzal) 5 mg, or fexofenadine (Allegra) 180 mg, patient reports she rotates antihistamines.    Pataday (olapatadine) 1 drop/eye daily as needed.  We did discuss if patient has dry eyes, this medication may make for dry eyes worse further evaluation would be needed by her eye  doctor.  Patient wears glasses.    Patient was given instructions for nasal saline irrigation, 1-2 times daily as needed.    If nasal saline irrigation is not tolerated, nasal saline sprays could be beneficial to remove allergens from the mucous membrane.    Patient was given instructions for environmental modifications techniques.    Patient was instructed with positive allergy evaluation, she may need potential candidate for allergy immunotherapy.  She was given basic information regarding allergy immunotherapy.  Patient is currently a student, is unsure how long she is going to stay in this area.    Quinten syndrome  Large local reactions that are itchy and even painful, associated with erythema, increased local temperature, swelling and induration.  They progress over 12 hours and may resolve within 3-10 days.  The prognosis is favorable.  Usually, the severity of symptoms gets better with age.  However, the time to resolution varies depending on the frequency and intensity of the patient's exposure to mosquitoes.  Diagnosis is based on history and physical exam.  Laboratory testing is not commercially available.    Skin testing is not practical.  Commercially available mosquito reagents are not standardized and contain minimal mosquito salivary allergens.    Prevention measure, in the summer begin taking daily non-sedating antihistamine, cetirizine (Zyrtec) 10 mg, levocetirizine (Xyzal) 5 mg, or fexofenadine (Allegra) 180 mg.     Take over-the-counter pain relievers/fever reducers such as acetaminophen or ibuprofen if you have pain or fever, or both if needed.   Mosquito avoidance and using DEET is important. DEET in a concentration of 10% can be applied safely to the skin.  To relieve itching, redness, and swelling, triamcinolone 0.1% ointment could be used.  I prescribed to the patient.    The best way to prevent quinten syndrome is to prevent mosquito bites.  Eliminating any standing water.  Avoiding areas  infested with mosquitoes.  Using a bug spray registered with the Environmental Protection Agency (typically containing DEET).  Wearing long pants and long sleeves. Wear thick clothes if you can because mosquitoes can bite through thin clothes.  Using screens to cover windows and doors.  Staying indoors when mosquitoes are most active (dusk and jamee).  Treating your clothing, tents and nets with mosquito repellants.  Covering your sleeping area with protective nets.    Follow-up as needed.    Thank you for allowing me to participate in the care of Kelsie Dobbs.    I spent 45 minutes on the date of the encounter doing chart review, history and exam, documentation and further coordination as noted above exclusive of separately reported interpretations.     Please note that this note consists of symbols derived from keyboarding, dictation and/or voice recognition software. As a result, there may be errors in the script that have gone undetected. Please consider this when interpreting information found in this chart.     MAGALIS Longoria [facility]

## 2024-11-08 NOTE — LETTER
11/8/2024      Kelsie Dobbs  [address] [address]      Dear Colleague,    Thank you for referring your patient, Kelsie Dobbs, to the [facility]. Please see a copy of my visit note below.    Kelsie Dobbs was seen in the Allergy Clinic at [facility].    Kelsie Dobbs is a 24 year old female  being seen today for ongoing evaluation of concerns for seasonal allergic rhinitis.  Referral from,   Monserrat Garcia DO [facility] FAMILY MEDICINE/OB     History of Present Illness:     Rhinoconjunctivitis    The onset of symptoms: childhood, worse since [employer]   Perennial/seasonally-exacerbated (Spring, Fall, and Perennial) chronic nasal symptoms (itch, clear rhinorrhea, stuffiness, and sneezing), postnasal drip and ocular symptoms (itching, redness, and watering).  Patient reports she has trialed a variety of antihistamines, with mild symptom improvement.  She has been on daily antihistamines for many years, they are only partially effective.  There is no history of PE tubes, sinus surgeries, or tonsillectomy/adenoidectomy.   Patient reports she has a past medical history of asthma, that is currently well-controlled.  Peq New Allergy And Asthma    Question 11/6/2024  7:32 PM CST - Filed by Patient   Reason for visit: Other   Please specify: Allergy test   Environmental and Social History    Place of Residence: House   Do you have Central Air Conditioning? Yes   Type of Heating System: Forced air   Wood burning stove or fireplace: No   Occupation:    Pets: Yes   Number and type of pets: 2, dog and hermit crabs   Do you smoke cigarettes: No   Do you use an e-cigarette or vape? No   Does anyone living in your home smoke or vape? No   Family History    Do your parents, siblings, or children have asthma? Yes   Who? Siblings   Do your parents, siblings, or children have environmental allergies? Yes   Who? Siblings   Do your  parents, siblings, or children have food allergies? No   Do your parents, siblings, or children have eczema? Yes   Who? Siblings, nephews, nieces       Past Medical History:   Diagnosis Date     Varicella 2003    3/30/07 - serology immune       No past surgical history on file.      Current Outpatient Medications:      adapalene (DIFFERIN) 0.1 % external gel, Apply topically at bedtime, Disp: 45 g, Rfl: 2     albuterol (PROAIR HFA) 108 (90 Base) MCG/ACT inhaler, Inhale 2 puffs 15 minutes before exercise and every 4 hours as needed for cough or wheeze, Disp: 8.5 g, Rfl: 3     clindamycin (CLEOCIN T) 1 % external lotion, Apply topically daily., Disp: 60 mL, Rfl: 0     fluticasone (FLONASE) 50 MCG/ACT nasal spray, Spray 1 spray into both nostrils daily., Disp: 16 g, Rfl: 11     fluticasone (FLOVENT DISKUS) 50 MCG/ACT inhaler, Inhale 2 puffs into the lungs every 12 hours., Disp: 60 each, Rfl: 4     loratadine (CLARITIN) 10 MG tablet, Take 1 tablet (10 mg) by mouth daily, Disp: 90 tablet, Rfl: 3     naproxen (NAPROSYN) 500 MG tablet, Take 1 tablet (500 mg) by mouth 2 times daily (with meals) Take at first sign of migraine, do not take longer than 3 days in a row, Disp: 60 tablet, Rfl: 1     norethindrone (MICRONOR) 0.35 MG tablet, Take 1 tablet (0.35 mg) by mouth daily., Disp: 84 tablet, Rfl: 4     ondansetron (ZOFRAN ODT) 4 MG ODT tab, Take 1 tablet (4 mg) by mouth every 8 hours as needed for nausea, Disp: 20 tablet, Rfl: 1     SUMAtriptan (IMITREX) 50 MG tablet, Take 1 tablet (50 mg) by mouth at onset of headache for migraine May repeat in 2 hours. Max 4 tablets/24 hours., Disp: 16 tablet, Rfl: 1  No Known Allergies      Family History   Problem Relation Age of Onset     Lung Cancer Mother 49        Dx in 2017,  5 months later. Not smoking related     Early Death Mother      Other Cancer Mother      Asthma Sister        EXAM:   There were no vitals taken for this visit.    Physical Exam  Constitutional:        General: She is not in acute distress.     Appearance: Normal appearance. She is not ill-appearing.   HENT:      Nose: Rhinorrhea present. No nasal deformity, septal deviation or congestion.      Right Turbinates: Not enlarged, swollen or pale.      Left Turbinates: Not enlarged, swollen or pale.      Mouth/Throat:      Mouth: Mucous membranes are moist.      Pharynx: Oropharynx is clear. Uvula midline. No posterior oropharyngeal erythema.      Tonsils: 0 on the right. 0 on the left.   Eyes:      General: No allergic shiner.        Right eye: No discharge.         Left eye: No discharge.      Conjunctiva/sclera: Conjunctivae normal.   Cardiovascular:      Rate and Rhythm: Normal rate and regular rhythm.      Heart sounds: Normal heart sounds, S1 normal and S2 normal.   Pulmonary:      Effort: Pulmonary effort is normal. No prolonged expiration.      Breath sounds: Normal breath sounds. No decreased air movement. No wheezing.   Neurological:      Mental Status: She is alert.   Psychiatric:         Mood and Affect: Mood normal.         Behavior: Behavior normal.         Judgment: Judgment normal.         WORKUP: IgE laboratory evaluation for environmental allergies, patient takes daily antihistamines.    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    Rhinoconjunctivitis  We will be in touch via [employer], after patient has completed laboratory evaluation/IgE testing for environmental allergies.  Azelastine (Astepro ) nasal spray, 1-2 sprays in each nostril twice daily as needed.  This medication may be better taste, please consider brushing your teeth after using this nasal spray.     If you feel like azelastine nasal spray is not adequately controlling your nasal symptoms, start intranasal fluticasone (Flonase) 1-2 sprays in each nostril once daily.     Cetirizine (Zyrtec) 10 mg, levocetirizine (Xyzal) 5 mg, or  fexofenadine (Allegra) 180 mg, patient reports she rotates antihistamines.    Pataday (olapatadine) 1 drop/eye daily as needed.  We did discuss if patient has dry eyes, this medication may make for dry eyes worse further evaluation would be needed by her eye doctor.  Patient wears glasses.    Patient was given instructions for nasal saline irrigation, 1-2 times daily as needed.    If nasal saline irrigation is not tolerated, nasal saline sprays could be beneficial to remove allergens from the mucous membrane.    Patient was given instructions for environmental modifications techniques.    Patient was instructed with positive allergy evaluation, she may need potential candidate for allergy immunotherapy.  She was given basic information regarding allergy immunotherapy.  Patient is currently a student, is unsure how long she is going to stay in this area.    Quinten syndrome  Large local reactions that are itchy and even painful, associated with erythema, increased local temperature, swelling and induration.  They progress over 12 hours and may resolve within 3-10 days.  The prognosis is favorable.  Usually, the severity of symptoms gets better with age.  However, the time to resolution varies depending on the frequency and intensity of the patient's exposure to mosquitoes.  Diagnosis is based on history and physical exam.  Laboratory testing is not commercially available.    Skin testing is not practical.  Commercially available mosquito reagents are not standardized and contain minimal mosquito salivary allergens.    Prevention measure, in the summer begin taking daily non-sedating antihistamine, cetirizine (Zyrtec) 10 mg, levocetirizine (Xyzal) 5 mg, or fexofenadine (Allegra) 180 mg.     Take over-the-counter pain relievers/fever reducers such as acetaminophen or ibuprofen if you have pain or fever, or both if needed.   Mosquito avoidance and using DEET is important. DEET in a concentration of 10% can be applied  safely to the skin.  To relieve itching, redness, and swelling, triamcinolone 0.1% ointment could be used.  I prescribed to the patient.    The best way to prevent joe syndrome is to prevent mosquito bites.  Eliminating any standing water.  Avoiding areas infested with mosquitoes.  Using a bug spray registered with the Environmental Protection Agency (typically containing DEET).  Wearing long pants and long sleeves. Wear thick clothes if you can because mosquitoes can bite through thin clothes.  Using screens to cover windows and doors.  Staying indoors when mosquitoes are most active (dusk and jamee).  Treating your clothing, tents and nets with mosquito repellants.  Covering your sleeping area with protective nets.    Follow-up as needed.    Thank you for allowing me to participate in the care of Kelsie Dobbs.    I spent 45 minutes on the date of the encounter doing chart review, history and exam, documentation and further coordination as noted above exclusive of separately reported interpretations.     Please note that this note consists of symbols derived from keyboarding, dictation and/or voice recognition software. As a result, there may be errors in the script that have gone undetected. Please consider this when interpreting information found in this chart.     Ania Yo PA-C  [facility]      Again, thank you for allowing me to participate in the care of your patient.        Sincerely,        Ania Yo PA-C

## 2024-11-11 LAB
A ALTERNATA IGE QN: <0.1 KU(A)/L
A FUMIGATUS IGE QN: <0.1 KU(A)/L
C HERBARUM IGE QN: <0.1 KU(A)/L
CALIF WALNUT POLN IGE QN: <0.1 KU(A)/L
CAT DANDER IGG QN: <0.1 KU(A)/L
CEDAR IGE QN: <0.1 KU(A)/L
COMMON RAGWEED IGE QN: <0.1 KU(A)/L
COTTONWOOD IGE QN: <0.1 KU(A)/L
D FARINAE IGE QN: <0.1 KU(A)/L
D PTERONYSS IGE QN: <0.1 KU(A)/L
DOG DANDER+EPITH IGE QN: <0.1 KU(A)/L
E PURPURASCENS IGE QN: <0.1 KU(A)/L
EAST WHITE PINE IGE QN: <0.1 KU(A)/L
ENGL PLANTAIN IGE QN: <0.1 KU(A)/L
FIREBUSH IGE QN: <0.1 KU(A)/L
GIANT RAGWEED IGE QN: <0.1 KU(A)/L
GOOSEFOOT IGE QN: <0.1 KU(A)/L
IGE SERPL-ACNC: 29 KU/L (ref 0–114)
JOHNSON GRASS IGE QN: <0.1 KU(A)/L
MAPLE IGE QN: <0.1 KU(A)/L
MUGWORT IGE QN: <0.1 KU(A)/L
NETTLE IGE QN: <0.1 KU(A)/L
P NOTATUM IGE QN: <0.1 KU(A)/L
RED MULBERRY IGE QN: <0.1 KU(A)/L
SALTWORT IGE QN: <0.1 KU(A)/L
SHEEP SORREL IGE QN: <0.1 KU(A)/L
SILVER BIRCH IGE QN: <0.1 KU(A)/L
TIMOTHY IGE QN: <0.1 KU(A)/L
WHITE ASH IGE QN: <0.1 KU(A)/L
WHITE ELM IGE QN: <0.1 KU(A)/L
WHITE MULBERRY IGE QN: <0.1 KU(A)/L
WHITE OAK IGE QN: <0.1 KU(A)/L
WORMWOOD IGE QN: <0.1 KU(A)/L

## 2024-12-16 ENCOUNTER — OFFICE VISIT (OUTPATIENT)
Dept: URGENT CARE | Facility: URGENT CARE | Age: 24
End: 2024-12-16
Payer: COMMERCIAL

## 2024-12-16 VITALS
OXYGEN SATURATION: 98 % | DIASTOLIC BLOOD PRESSURE: 70 MMHG | BODY MASS INDEX: 27.6 KG/M2 | WEIGHT: 150 LBS | HEART RATE: 126 BPM | TEMPERATURE: 102.2 F | HEIGHT: 62 IN | SYSTOLIC BLOOD PRESSURE: 104 MMHG | RESPIRATION RATE: 15 BRPM

## 2024-12-16 DIAGNOSIS — J10.1 INFLUENZA A: Primary | ICD-10-CM

## 2024-12-16 DIAGNOSIS — H69.91 DYSFUNCTION OF RIGHT EUSTACHIAN TUBE: ICD-10-CM

## 2024-12-16 DIAGNOSIS — R07.0 THROAT PAIN: ICD-10-CM

## 2024-12-16 LAB
DEPRECATED S PYO AG THROAT QL EIA: NEGATIVE
FLUAV AG SPEC QL IA: POSITIVE
FLUBV AG SPEC QL IA: NEGATIVE
S PYO DNA THROAT QL NAA+PROBE: NOT DETECTED
SARS-COV-2 RNA RESP QL NAA+PROBE: NEGATIVE

## 2024-12-16 PROCEDURE — 87651 STREP A DNA AMP PROBE: CPT | Performed by: PHYSICIAN ASSISTANT

## 2024-12-16 PROCEDURE — 87804 INFLUENZA ASSAY W/OPTIC: CPT | Performed by: PHYSICIAN ASSISTANT

## 2024-12-16 PROCEDURE — 87635 SARS-COV-2 COVID-19 AMP PRB: CPT | Performed by: PHYSICIAN ASSISTANT

## 2024-12-16 PROCEDURE — 99214 OFFICE O/P EST MOD 30 MIN: CPT | Performed by: PHYSICIAN ASSISTANT

## 2024-12-16 RX ORDER — OSELTAMIVIR PHOSPHATE 75 MG/1
75 CAPSULE ORAL 2 TIMES DAILY
Qty: 10 CAPSULE | Refills: 0 | Status: SHIPPED | OUTPATIENT
Start: 2024-12-16

## 2024-12-16 RX ORDER — FLUTICASONE PROPIONATE 50 MCG
2 SPRAY, SUSPENSION (ML) NASAL AT BEDTIME
Qty: 18.2 ML | Refills: 0 | Status: SHIPPED | OUTPATIENT
Start: 2024-12-16

## 2024-12-16 RX ORDER — DEXTROMETHORPHAN POLISTIREX 30 MG/5ML
60 SUSPENSION ORAL 2 TIMES DAILY
Qty: 148 ML | Refills: 0 | Status: SHIPPED | OUTPATIENT
Start: 2024-12-16

## 2024-12-16 RX ORDER — IBUPROFEN 600 MG/1
600 TABLET, FILM COATED ORAL EVERY 6 HOURS PRN
Qty: 40 TABLET | Refills: 0 | Status: SHIPPED | OUTPATIENT
Start: 2024-12-16

## 2024-12-16 RX ORDER — OSELTAMIVIR PHOSPHATE 75 MG/1
75 CAPSULE ORAL 2 TIMES DAILY
Qty: 10 CAPSULE | Refills: 0 | Status: SHIPPED | OUTPATIENT
Start: 2024-12-16 | End: 2024-12-16

## 2024-12-16 NOTE — PATIENT INSTRUCTIONS
(J10.1) Influenza A  (primary encounter diagnosis)  Comment:   Plan: oseltamivir (TAMIFLU) 75 MG capsule,         dextromethorphan (DELSYM) 30 MG/5ML liquid            (R07.0) Throat pain  Comment:   Plan: Streptococcus A Rapid Screen w/Reflex to PCR -         Clinic Collect, Influenza A/B antigen, Group A         Streptococcus PCR Throat Swab, ibuprofen         (ADVIL/MOTRIN) 600 MG tablet, COVID-19 Virus         (Coronavirus) by PCR            (H69.91) Dysfunction of right eustachian tube  Comment:   Plan: fluticasone (FLONASE) 50 MCG/ACT nasal spray        2 sprays each nostril at bedtime for 2-3 weeks.      Considered contagious until you have been fever free for 24 hours off of fever reducing medication.     Follow up with Primary clinic should symptoms persist or worsen.

## 2024-12-16 NOTE — PROGRESS NOTES
Patient presents with:  Urgent Care: Ear pain, headache, body aches, sore throat and cough since Friday.     (J10.1) Influenza A  (primary encounter diagnosis)  Comment:   Plan: oseltamivir (TAMIFLU) 75 MG capsule,         dextromethorphan (DELSYM) 30 MG/5ML liquid            (R07.0) Throat pain  Comment:   Plan: Streptococcus A Rapid Screen w/Reflex to PCR -         Clinic Collect, Influenza A/B antigen, Group A         Streptococcus PCR Throat Swab, ibuprofen         (ADVIL/MOTRIN) 600 MG tablet, COVID-19 Virus         (Coronavirus) by PCR            (H69.91) Dysfunction of right eustachian tube  Comment:   Plan: fluticasone (FLONASE) 50 MCG/ACT nasal spray        2 sprays each nostril at bedtime for 2-3 weeks.      Considered contagious until you have been fever free for 24 hours off of fever reducing medication.     Follow up with Primary clinic should symptoms persist or worsen.        At the end of the encounter, I discussed results, diagnosis, medications. Discussed red flags for immediate return to clinic/ER, as well as indications for follow up if no improvement. Patient understood and agreed to plan. Patient was stable for discharge     If not improving or if condition worsens, follow up with your Primary Care Provider      31 minutes spent by me on the date of the encounter doing chart review, review of test results, interpretation of tests, patient visit, documentation, and discussion with family       SUBJECTIVE:   Kelsie Dobbs is a 24 year old female who presents today with body ache, sore throat and fever for the past 3 days.  Today is day 3.  She is here today with her brother who has the same symptoms.  Also complains of right ear pain today.    Patient Active Problem List   Diagnosis    Varicella    Death of parent    Wears glasses    Acne vulgaris    Mild persistent asthma without complication    Migraine with aura and without status migrainosus, not intractable         Past Medical History:  "  Diagnosis Date    Varicella 08/28/2003    3/30/07 - serology immune         Current Outpatient Medications   Medication Sig Dispense Refill    Multiple Vitamins-Iron (DAILY-VALENTE/IRON/BETA-CAROTENE) TABS TAKE 1 TABLET BY MOUTH DAILY. (Patient not taking: Reported on 10/19/2020) 30 tablet 7     Social History     Tobacco Use    Smoking status: Never Smoker    Smokeless tobacco: Never Used   Substance Use Topics    Alcohol use: Not on file     Family History   Problem Relation Age of Onset    Diabetes Mother     Diabetes Father          ROS:    10 point ROS of systems including Constitutional, Eyes, Respiratory, Cardiovascular, Gastroenterology, Genitourinary, Integumentary, Muscularskeletal, Psychiatric ,neurological were all negative except for pertinent positives noted in my HPI       OBJECTIVE:  /70   Pulse (!) 126   Temp (!) 102.2  F (39  C) (Temporal)   Resp 15   Ht 1.575 m (5' 2\")   Wt 68 kg (150 lb)   SpO2 98%   BMI 27.44 kg/m    Physical Exam:  GENERAL APPEARANCE: healthy, alert and no distress  EYES: EOMI,  PERRL, conjunctiva clear  HENT: ear canals and TM's normal, but right TM is retracted.  Nose with boggy turbinates right turbinate nearly occluding the right nares.  And mouth without ulcers, erythema or lesions  NECK: supple, nontender, no lymphadenopathy  RESP: lungs clear to auscultation - no rales, rhonchi or wheezes  CV: regular rates and rhythm, normal S1 S2, no murmur noted  NEURO: Normal strength and tone, sensory exam grossly normal,  normal speech and mentation  SKIN: no suspicious lesions or rashes    Results for orders placed or performed in visit on 12/16/24   Streptococcus A Rapid Screen w/Reflex to PCR - Clinic Collect     Status: Normal    Specimen: Throat; Swab   Result Value Ref Range    Group A Strep antigen Negative Negative   Influenza A/B antigen     Status: Abnormal    Specimen: Nose; Swab   Result Value Ref Range    Influenza A antigen Positive (A) Negative    " Influenza B antigen Negative Negative    Narrative    Test results must be correlated with clinical data. If necessary, results should be confirmed by a molecular assay or viral culture.

## 2024-12-16 NOTE — LETTER
December 16, 2024      Kelsie Dobbs  [address] [address]        To Whom It May Concern:    Kelsie Dobbs was seen in our clinic today was diagnosed with Influenza A.  She is considered contagious until she has been fever free for 24 hours off of fever reducing medication.       Sincerely,        Marva ARANGO, MAGALIS